# Patient Record
Sex: FEMALE | Race: BLACK OR AFRICAN AMERICAN | NOT HISPANIC OR LATINO | Employment: UNEMPLOYED | URBAN - METROPOLITAN AREA
[De-identification: names, ages, dates, MRNs, and addresses within clinical notes are randomized per-mention and may not be internally consistent; named-entity substitution may affect disease eponyms.]

---

## 2017-02-25 RX ORDER — FUROSEMIDE 40 MG/1
TABLET ORAL
Qty: 30 TABLET | Refills: 0 | Status: SHIPPED | OUTPATIENT
Start: 2017-02-25 | End: 2017-04-26 | Stop reason: SDUPTHER

## 2017-02-25 RX ORDER — LISINOPRIL 20 MG/1
TABLET ORAL
Qty: 30 TABLET | Refills: 0 | Status: SHIPPED | OUTPATIENT
Start: 2017-02-25 | End: 2017-04-26 | Stop reason: SDUPTHER

## 2017-03-09 DIAGNOSIS — I50.42 CHRONIC COMBINED SYSTOLIC AND DIASTOLIC CONGESTIVE HEART FAILURE: ICD-10-CM

## 2017-03-09 RX ORDER — CARVEDILOL 12.5 MG/1
TABLET ORAL
Qty: 60 TABLET | Refills: 0 | Status: SHIPPED | OUTPATIENT
Start: 2017-03-09 | End: 2017-04-26 | Stop reason: SDUPTHER

## 2017-04-26 DIAGNOSIS — I50.42 CHRONIC COMBINED SYSTOLIC AND DIASTOLIC CONGESTIVE HEART FAILURE: ICD-10-CM

## 2017-04-26 RX ORDER — LISINOPRIL 20 MG/1
TABLET ORAL
Qty: 30 TABLET | Refills: 0 | Status: SHIPPED | OUTPATIENT
Start: 2017-04-26 | End: 2017-05-08 | Stop reason: SDUPTHER

## 2017-04-26 RX ORDER — FUROSEMIDE 40 MG/1
TABLET ORAL
Qty: 30 TABLET | Refills: 0 | Status: SHIPPED | OUTPATIENT
Start: 2017-04-26 | End: 2017-06-06 | Stop reason: SDUPTHER

## 2017-04-26 RX ORDER — CARVEDILOL 12.5 MG/1
TABLET ORAL
Qty: 60 TABLET | Refills: 0 | Status: SHIPPED | OUTPATIENT
Start: 2017-04-26 | End: 2017-06-06 | Stop reason: SDUPTHER

## 2017-05-02 DIAGNOSIS — I50.22 CHRONIC SYSTOLIC HEART FAILURE: Primary | ICD-10-CM

## 2017-05-08 ENCOUNTER — OFFICE VISIT (OUTPATIENT)
Dept: TRANSPLANT | Facility: CLINIC | Age: 52
End: 2017-05-08
Payer: COMMERCIAL

## 2017-05-08 ENCOUNTER — HOSPITAL ENCOUNTER (OUTPATIENT)
Dept: CARDIOLOGY | Facility: CLINIC | Age: 52
Discharge: HOME | End: 2017-05-08
Payer: COMMERCIAL

## 2017-05-08 VITALS
WEIGHT: 136.88 LBS | HEART RATE: 72 BPM | SYSTOLIC BLOOD PRESSURE: 157 MMHG | HEIGHT: 64 IN | BODY MASS INDEX: 23.37 KG/M2 | DIASTOLIC BLOOD PRESSURE: 69 MMHG

## 2017-05-08 DIAGNOSIS — I10 ESSENTIAL HYPERTENSION: ICD-10-CM

## 2017-05-08 DIAGNOSIS — I50.32 CHRONIC DIASTOLIC CONGESTIVE HEART FAILURE: Primary | ICD-10-CM

## 2017-05-08 DIAGNOSIS — I50.22 CHRONIC SYSTOLIC HEART FAILURE: ICD-10-CM

## 2017-05-08 DIAGNOSIS — I42.8 CARDIOMYOPATHY, NONISCHEMIC: ICD-10-CM

## 2017-05-08 LAB
AORTIC VALVE REGURGITATION: ABNORMAL
ESTIMATED PA SYSTOLIC PRESSURE: 34.36
MITRAL VALVE REGURGITATION: ABNORMAL
RETIRED EF AND QEF - SEE NOTES: 55 (ref 55–65)
TRICUSPID VALVE REGURGITATION: ABNORMAL

## 2017-05-08 PROCEDURE — 3077F SYST BP >= 140 MM HG: CPT | Mod: S$GLB,,, | Performed by: INTERNAL MEDICINE

## 2017-05-08 PROCEDURE — 3078F DIAST BP <80 MM HG: CPT | Mod: S$GLB,,, | Performed by: INTERNAL MEDICINE

## 2017-05-08 PROCEDURE — 99214 OFFICE O/P EST MOD 30 MIN: CPT | Mod: S$GLB,,, | Performed by: INTERNAL MEDICINE

## 2017-05-08 PROCEDURE — 99999 PR PBB SHADOW E&M-EST. PATIENT-LVL III: CPT | Mod: PBBFAC,,, | Performed by: INTERNAL MEDICINE

## 2017-05-08 PROCEDURE — 93306 TTE W/DOPPLER COMPLETE: CPT | Mod: S$GLB,,, | Performed by: INTERNAL MEDICINE

## 2017-05-08 PROCEDURE — 1160F RVW MEDS BY RX/DR IN RCRD: CPT | Mod: S$GLB,,, | Performed by: INTERNAL MEDICINE

## 2017-05-08 RX ORDER — LISINOPRIL 10 MG/1
10 TABLET ORAL DAILY
Qty: 90 TABLET | Refills: 3 | Status: SHIPPED | OUTPATIENT
Start: 2017-05-08 | End: 2019-01-30 | Stop reason: SDUPTHER

## 2017-05-08 RX ORDER — IBUPROFEN 800 MG/1
TABLET ORAL
COMMUNITY
Start: 2017-05-01 | End: 2022-01-14

## 2017-05-08 NOTE — PROGRESS NOTES
"Subjective:     HPI:  Ms. Diaz is very pleasant 48 y/o female with PMH significant for NICMP LVEF 25% with reverse remodeling to 60% on Echo done today. She is doing well and continues to report NYHA class II symptoms.  She denies SOB at rest, CP, lightheadedness, or other c/o's. No admissions for ADHF in the last year. Her HF regimen incudes Coreg 12.5 mg BID, Lasix 40 mg daily and was on Lisinopril 20 mg daily but states that it was discontinued last week because of low BP. Her BP today is elevated.   Creatinine is 0.9.    Past Medical History:   Diagnosis Date    Cardiomyopathy     CHF (congestive heart failure)     Hypertension      No past surgical history on file.  OB History     No data available        Review of Systems   Constitution: Negative. Negative for chills, decreased appetite, diaphoresis, fever, weakness, malaise/fatigue, night sweats, weight gain and weight loss.   Eyes: Negative.    Cardiovascular: Negative for chest pain, claudication, cyanosis, dyspnea on exertion, irregular heartbeat, leg swelling, near-syncope, orthopnea, palpitations, paroxysmal nocturnal dyspnea and syncope.   Respiratory: Negative for cough, hemoptysis and shortness of breath.    Endocrine: Negative.    Hematologic/Lymphatic: Negative.    Skin: Negative for color change, dry skin and nail changes.   Musculoskeletal: Negative.    Gastrointestinal: Negative.    Genitourinary: Negative.        Objective:   Blood pressure (!) 157/69, pulse 72, height 5' 4" (1.626 m), weight 62.1 kg (136 lb 14.5 oz).body mass index is 23.5 kg/(m^2).  Physical Exam   Constitutional: She is oriented to person, place, and time. Vital signs are normal. She appears well-developed and well-nourished.   HENT:   Head: Normocephalic.   Eyes: Pupils are equal, round, and reactive to light.   Neck: Neck supple. No JVD present.   Cardiovascular: Normal rate, regular rhythm and normal heart sounds.  PMI is not displaced.  Exam reveals no gallop and no " friction rub.    No murmur heard.  Pulmonary/Chest: Effort normal and breath sounds normal. No respiratory distress. She has no wheezes. She has no rales.   Abdominal: Soft. Bowel sounds are normal. She exhibits no distension. There is no tenderness. There is no rebound.   Musculoskeletal: She exhibits no edema.   Neurological: She is alert and oriented to person, place, and time.   Nursing note and vitals reviewed.      Labs:    Chemistry        Component Value Date/Time     05/08/2017 1109    K 4.7 05/08/2017 1109     (H) 05/08/2017 1109    CO2 25 05/08/2017 1109    BUN 8 05/08/2017 1109    CREATININE 0.9 05/08/2017 1109    GLU 87 05/08/2017 1109        Component Value Date/Time    CALCIUM 9.8 05/08/2017 1109    ALKPHOS 67 05/08/2017 1109    AST 14 05/08/2017 1109    ALT 9 (L) 05/08/2017 1109    BILITOT 0.3 05/08/2017 1109          Magnesium   Date Value Ref Range Status   05/08/2017 2.1 1.6 - 2.6 mg/dL Final     Lab Results   Component Value Date    WBC 8.68 05/08/2017    HGB 10.5 (L) 05/08/2017    HCT 30.3 (L) 05/08/2017     05/08/2017     Lab Results   Component Value Date    INR 1.1 12/25/2010    INR 1.1 12/24/2010     BNP   Date Value Ref Range Status   05/08/2017 224 (H) 0 - 99 pg/mL Final     Comment:     Values of less than 100 pg/ml are consistent with non-CHF populations.   09/10/2015 143 (H) 0 - 99 pg/mL Final     Comment:     Values of less than 100 pg/ml are consistent with non-CHF populations.   07/28/2014 34 0 - 99 pg/mL Final     Comment:     Values of less than 100 pg/ml are consistent with non-CHF populations.     No results found for: LDH  No results found for this or any previous visit.  No results found for this or any previous visit.    Assessment:      1. Chronic diastolic congestive heart failure    2. Cardiomyopathy, nonischemic    3. Essential hypertension        Plan:   Doing very well. Euvolemic on exam. NYHA class II symptoms. EF=55-60% by echo today  Continue GDMT.  In view of her elevated BP, will resume Lisinopril 10 mg daily. BMP in 1 week.    Recommend 2 gram sodium restriction and 1500cc fluid restriction.  Encourage physical activity with graded exercise program.  Requested patient to weigh themselves daily, and to notify us if their weight increases by more than 3 lbs in 1 day or 5 lbs in 1 week.   RTC in 1 year with labs     Aldo jordan MD

## 2017-05-08 NOTE — MR AVS SNAPSHOT
[facility]  [address]  [place] [address]  Phone: [phone]                  Nafisa Diaz   2017 2:00 PM   Office Visit    Description:  Female : 1965   Provider:  Aldo Marcus MD   Department:  [facility]           Reason for Visit     Congestive Heart Failure           Diagnoses this Visit        Comments    Chronic diastolic congestive heart failure    -  Primary     Cardiomyopathy, nonischemic         Essential hypertension                To Do List           Goals (5 Years of Data)     None      Follow-Up and Disposition     Return in about 1 year (around 2018).       These Medications        Disp Refills Start End    lisinopril 10 MG tablet 90 tablet 3 2017     Take 1 tablet (10 mg total) by mouth once daily. - Oral    Pharmacy: [facility] - [place], [place] - [address] Ph #: [phone]         [facility]     [facility] Nurse Care Line -  Assistance  Unless otherwise directed by your provider, please contact [facility], our nurse care line that is available for  assistance.     Registered nurses in the [facility] provide: appointment scheduling, clinical advisement, health education, and other advisory services.  Call: [phone] (toll free)               Medications           Message regarding Medications     Verify the changes and/or additions to your medication regime listed below are the same as discussed with your clinician today.  If any of these changes or additions are incorrect, please notify your healthcare provider.        CHANGE how you are taking these medications     Start Taking Instead of    lisinopril 10 MG tablet lisinopril (PRINIVIL,ZESTRIL) 20 MG tablet    Dosage:  Take 1 tablet (10 mg total) by mouth once daily. Dosage:  TAKE 1 TABLET ONCE DAILY FOR BLOOD PRESSURE    Reason for Change:  Reorder            Verify that the below list of medications  "is an accurate representation of the medications you are currently taking.  If none reported, the list may be blank. If incorrect, please contact your healthcare provider. Carry this list with you in case of emergency.           Current Medications     aspirin (ECOTRIN) 81 MG EC tablet Take 1 tablet by mouth.    carvedilol (COREG) 12.5 MG tablet TAKE ONE TABLET BY MOUTH 2 TIMES A DAY WITH MEALS    ferrous sulfate 325 (65 FE) MG EC tablet Take 1 tablet by mouth.    furosemide (LASIX) 40 MG tablet TAKE 1 TABLET BY MOUTH ONCE DAILY.    ibuprofen (ADVIL,MOTRIN) 800 MG tablet     lisinopril 10 MG tablet Take 1 tablet (10 mg total) by mouth once daily.    zolpidem (AMBIEN) 5 MG Tab Take 1 tablet by mouth.           Clinical Reference Information           Your Vitals Were     BP Pulse Height Weight BMI    157/69 (BP Location: Left arm, Patient Position: Sitting, BP Method: Automatic) 72 5' 4" (1.626 m) 62.1 kg (136 lb 14.5 oz) 23.5 kg/m2      Blood Pressure          Most Recent Value    BP  (!)  157/69      Allergies as of 5/8/2017     No Known Drug Allergies      Immunizations Administered on Date of Encounter - 5/8/2017     None      Orders Placed During Today's Visit     Future Labs/Procedures Expected by Expires    Basic metabolic panel  5/15/2017 (Approximate) 7/7/2018    Comprehensive metabolic panel  As directed 5/8/2018    Recurring Lab Work Interval Expires    Brain natriuretic peptide  Every 4 Weeks until 5/8/2018 5/8/2018      [facility] Sign-Up     Activating your [facility] account is as easy as 1-2-3!     1) Visit [email or web address], select Sign Up Now, enter this activation code and your date of birth, then select Next.  [email or web address]  Expires: 6/22/2017  2:48 PM      2) Create a username and password to use when you visit [facility] in the future and select a security question in case you lose your password and select Next.    3) Enter your e-mail address and click Sign Up!    Additional Information  If you " have questions, please e-mail [email or web address] or call [phone] to talk to our [facility] staff. Remember, [facility] is NOT to be used for urgent needs. For medical emergencies, dial 911.         Smoking Cessation     If you would like to quit smoking:   You may be eligible for free services if you are a [place] resident and started smoking cigarettes before September 1, 1988.  Call the Smoking Cessation Trust ([facility]) toll free at (904) [phone] or [phone].   Call 1-800-QUIT-NOW if you do not meet the above criteria.   Contact us via email: [email or web address]   View our website for more information: [email or web address]        Language Assistance Services     ATTENTION: Language assistance services are available, free of charge. Please call [phone].      ATENCIÓN: Si habla español, tiene a nobles disposición servicios gratuitos de asistencia lingüística. Llame al [phone].     CHÚ Ý: N?u b?n nói Ti?ng Vi?t, có các d?ch v? h? tr? ngôn ng? mi?n phí dành cho b?n. G?i s? [phone].         [facility] complies with applicable Federal civil rights laws and does not discriminate on the basis of race, color, national origin, age, disability, or sex.

## 2017-06-06 DIAGNOSIS — I50.42 CHRONIC COMBINED SYSTOLIC AND DIASTOLIC CONGESTIVE HEART FAILURE: ICD-10-CM

## 2017-06-06 RX ORDER — FUROSEMIDE 40 MG/1
TABLET ORAL
Qty: 30 TABLET | Refills: 0 | Status: SHIPPED | OUTPATIENT
Start: 2017-06-06 | End: 2017-07-18 | Stop reason: SDUPTHER

## 2017-06-07 RX ORDER — CARVEDILOL 12.5 MG/1
TABLET ORAL
Qty: 60 TABLET | Refills: 0 | Status: SHIPPED | OUTPATIENT
Start: 2017-06-07 | End: 2017-07-18 | Stop reason: SDUPTHER

## 2017-07-18 DIAGNOSIS — I50.42 CHRONIC COMBINED SYSTOLIC AND DIASTOLIC CONGESTIVE HEART FAILURE: ICD-10-CM

## 2017-07-18 RX ORDER — FUROSEMIDE 40 MG/1
TABLET ORAL
Qty: 30 TABLET | Refills: 0 | Status: SHIPPED | OUTPATIENT
Start: 2017-07-18 | End: 2017-09-11 | Stop reason: SDUPTHER

## 2017-07-20 RX ORDER — CARVEDILOL 12.5 MG/1
TABLET ORAL
Qty: 60 TABLET | Refills: 0 | Status: SHIPPED | OUTPATIENT
Start: 2017-07-20 | End: 2017-07-21 | Stop reason: SDUPTHER

## 2017-07-21 DIAGNOSIS — I50.42 CHRONIC COMBINED SYSTOLIC AND DIASTOLIC CONGESTIVE HEART FAILURE: ICD-10-CM

## 2017-07-24 RX ORDER — CARVEDILOL 12.5 MG/1
TABLET ORAL
Qty: 60 TABLET | Refills: 0 | Status: SHIPPED | OUTPATIENT
Start: 2017-07-24 | End: 2017-12-06 | Stop reason: SDUPTHER

## 2017-09-11 RX ORDER — FUROSEMIDE 40 MG/1
TABLET ORAL
Qty: 30 TABLET | Refills: 0 | Status: SHIPPED | OUTPATIENT
Start: 2017-09-11 | End: 2017-10-06 | Stop reason: SDUPTHER

## 2017-10-06 RX ORDER — FUROSEMIDE 40 MG/1
TABLET ORAL
Qty: 30 TABLET | Refills: 0 | Status: SHIPPED | OUTPATIENT
Start: 2017-10-06 | End: 2017-12-06 | Stop reason: SDUPTHER

## 2017-12-06 DIAGNOSIS — I50.42 CHRONIC COMBINED SYSTOLIC AND DIASTOLIC CONGESTIVE HEART FAILURE: ICD-10-CM

## 2017-12-06 RX ORDER — CARVEDILOL 12.5 MG/1
TABLET ORAL
Qty: 60 TABLET | Refills: 0 | Status: SHIPPED | OUTPATIENT
Start: 2017-12-06 | End: 2018-01-15 | Stop reason: SDUPTHER

## 2017-12-06 RX ORDER — FUROSEMIDE 40 MG/1
TABLET ORAL
Qty: 30 TABLET | Refills: 0 | Status: SHIPPED | OUTPATIENT
Start: 2017-12-06 | End: 2018-01-15 | Stop reason: SDUPTHER

## 2018-01-03 ENCOUNTER — HOSPITAL ENCOUNTER (EMERGENCY)
Facility: HOSPITAL | Age: 53
Discharge: HOME | End: 2018-01-03
Attending: EMERGENCY MEDICINE
Payer: COMMERCIAL

## 2018-01-03 VITALS
HEIGHT: 65 IN | DIASTOLIC BLOOD PRESSURE: 62 MMHG | HEART RATE: 66 BPM | OXYGEN SATURATION: 99 % | SYSTOLIC BLOOD PRESSURE: 116 MMHG | WEIGHT: 125 LBS | BODY MASS INDEX: 20.83 KG/M2 | RESPIRATION RATE: 18 BRPM | TEMPERATURE: 99 F

## 2018-01-03 DIAGNOSIS — K62.5 RECTAL BLEEDING: Primary | ICD-10-CM

## 2018-01-03 LAB
ALBUMIN SERPL BCP-MCNC: 3.7 G/DL
ALP SERPL-CCNC: 73 U/L
ALT SERPL W/O P-5'-P-CCNC: 14 U/L
ANION GAP SERPL CALC-SCNC: 8 MMOL/L
AST SERPL-CCNC: 16 U/L
BASOPHILS # BLD AUTO: 0.07 K/UL
BASOPHILS NFR BLD: 0.7 %
BILIRUB SERPL-MCNC: 0.3 MG/DL
BUN SERPL-MCNC: 10 MG/DL
CALCIUM SERPL-MCNC: 9.4 MG/DL
CHLORIDE SERPL-SCNC: 110 MMOL/L
CO2 SERPL-SCNC: 24 MMOL/L
CREAT SERPL-MCNC: 0.9 MG/DL
DIFFERENTIAL METHOD: ABNORMAL
EOSINOPHIL # BLD AUTO: 0.3 K/UL
EOSINOPHIL NFR BLD: 3.5 %
ERYTHROCYTE [DISTWIDTH] IN BLOOD BY AUTOMATED COUNT: 14 %
EST. GFR  (AFRICAN AMERICAN): >60 ML/MIN/1.73 M^2
EST. GFR  (NON AFRICAN AMERICAN): >60 ML/MIN/1.73 M^2
GLUCOSE SERPL-MCNC: 90 MG/DL
HCT VFR BLD AUTO: 32.3 %
HGB BLD-MCNC: 11 G/DL
IMM GRANULOCYTES # BLD AUTO: 0.04 K/UL
IMM GRANULOCYTES NFR BLD AUTO: 0.4 %
LYMPHOCYTES # BLD AUTO: 1.8 K/UL
LYMPHOCYTES NFR BLD: 18.7 %
MCH RBC QN AUTO: 26.9 PG
MCHC RBC AUTO-ENTMCNC: 34.1 G/DL
MCV RBC AUTO: 79 FL
MONOCYTES # BLD AUTO: 0.7 K/UL
MONOCYTES NFR BLD: 7.1 %
NEUTROPHILS # BLD AUTO: 6.7 K/UL
NEUTROPHILS NFR BLD: 69.6 %
NRBC BLD-RTO: 0 /100 WBC
PLATELET # BLD AUTO: 271 K/UL
PMV BLD AUTO: 10.5 FL
POTASSIUM SERPL-SCNC: 3.7 MMOL/L
PROT SERPL-MCNC: 7.2 G/DL
RBC # BLD AUTO: 4.09 M/UL
SODIUM SERPL-SCNC: 142 MMOL/L
WBC # BLD AUTO: 9.63 K/UL

## 2018-01-03 PROCEDURE — 80053 COMPREHEN METABOLIC PANEL: CPT

## 2018-01-03 PROCEDURE — 85025 COMPLETE CBC W/AUTO DIFF WBC: CPT

## 2018-01-03 PROCEDURE — 99283 EMERGENCY DEPT VISIT LOW MDM: CPT

## 2018-01-03 NOTE — ED TRIAGE NOTES
Presents to ER with bright red rectal bleeding that has occurred off and on 2 weeks.  Denies nausea, vomiting or abdominal cramping.    GENERAL: The patient is well-developed and well-nourished in no apparent distress. Alert and oriented x4.                                                HEENT: Head is normocephalic and atraumatic. Extraocular muscles are intact. Pupils are equal, round, and reactive to light and accommodation. Nares appeared normal. Mouth is well hydrated and without lesions. Mucous membranes are moist. Posterior pharynx clear of any exudate or lesions.     LUNGS: Clear to auscultation.    HEART: Regular rate and rhythm.    ABDOMEN: Soft, nontender, and nondistended. Positive bowel sounds. No hepatosplenomegaly was noted.     EXTREMITIES: Without any cyanosis, clubbing, rash, lesions or edema.     NEUROLOGIC: GCS 15     PSYCHIATRIC: Flat affect, but denies suicidal or homicidal ideations.    SKIN: No ulceration or induration present.

## 2018-01-03 NOTE — ED PROVIDER NOTES
Encounter Date: 1/3/2018    SCRIBE #1 NOTE: I, Mark Peter, am scribing for, and in the presence of,  Shruti Moser. I have scribed the following portions of the note - Other sections scribed: HPI, PE, and APC MDM.       History     Chief Complaint   Patient presents with    Rectal Bleeding     stool brown, with blood, not on blood thinners     Time patient was seen by the provider: 11:40 AM    The patient is a 53 y.o. female with hx of: Hemorrhoids, CHF, HTN who presents to the ED with a complaint of bright red blood per rectum. She reports an episode of bleeding last month and intermittent bleeding for the past week.  She also reports intermittent lower abdominal cramping over the past couple of months. Of note, pt has a history of hemorrhoids requiring surgical removal. Pt reports her last colonoscopy was over 10 years ago. Denies melena, weakness, nausea, vomiting, diarrhea, SOB, chest pain.       The history is provided by the patient.     Review of patient's allergies indicates:   Allergen Reactions    No known drug allergies      Past Medical History:   Diagnosis Date    Cardiomyopathy     CHF (congestive heart failure)     Hypertension      History reviewed. No pertinent surgical history.  History reviewed. No pertinent family history.  Social History   Substance Use Topics    Smoking status: Current Some Day Smoker    Smokeless tobacco: Never Used    Alcohol use Not on file     Review of Systems   Constitutional: Negative for fever.   HENT: Negative for sore throat.    Respiratory: Negative for shortness of breath.    Cardiovascular: Negative for chest pain.   Gastrointestinal: Positive for abdominal pain and anal bleeding. Negative for nausea.   Genitourinary: Negative for dysuria.   Musculoskeletal: Negative for back pain.   Skin: Negative for rash.   Neurological: Negative for weakness.   Hematological: Does not bruise/bleed easily.       Physical Exam     Initial Vitals [01/03/18 1109]   BP Pulse  Resp Temp SpO2   (!) 167/80 79 18 98.4 °F (36.9 °C) 97 %      MAP       109         Physical Exam    Nursing note and vitals reviewed.  Constitutional: She appears well-developed and well-nourished. She is not diaphoretic.  Non-toxic appearance. She does not appear ill. No distress.   HENT:   Head: Normocephalic and atraumatic.   Neck: Neck supple.   Cardiovascular: Normal rate and regular rhythm. Exam reveals no gallop and no friction rub.    No murmur heard.  Pulmonary/Chest: Effort normal and breath sounds normal. No accessory muscle usage. No tachypnea. No respiratory distress. She has no decreased breath sounds. She has no wheezes. She has no rhonchi. She has no rales.   Abdominal: Soft. Normal appearance and bowel sounds are normal. She exhibits no distension. There is no tenderness.   No CVA tenderness    Genitourinary:   Genitourinary Comments: Small internal hemorrhoids. No soraya blood. Hemoccult negative.    Musculoskeletal: Normal range of motion.   Neurological: She is alert and oriented to person, place, and time.   Skin: Skin is warm and dry. No rash noted. No pallor.   Psychiatric: She has a normal mood and affect. Her behavior is normal. Judgment and thought content normal.         ED Course   Procedures  Labs Reviewed   CBC W/ AUTO DIFFERENTIAL - Abnormal; Notable for the following:        Result Value    Hemoglobin 11.0 (*)     Hematocrit 32.3 (*)     MCV 79 (*)     MCH 26.9 (*)     All other components within normal limits   COMPREHENSIVE METABOLIC PANEL             Medical Decision Making:   History:   Old Medical Records: I decided to obtain old medical records.  Differential Diagnosis:   My differential diagnosis includes but is not limited to: bleeding hemorrhoids, diverticular bleed, anal fissure, and anemia.    Clinical Tests:   Lab Tests: Ordered and Reviewed       APC / Resident Notes:   53-year-old female presents with bright red blood per rectum intermittently over the past week.  She is  hypertensive to 167/80.  Vitals otherwise within normal limits.  Physical exam is remarkable for small internal hemorrhoid.  No soraya blood.  Hemoccult is negative.  Abdomen was soft and nontender.    CBC and CMP are unremarkable for acute findings.  I will discharge patient in stable condition with contact information for follow-up with [facility] for further evaluation with colonoscopy.  Return precautions given. I have reviewed the patient's records and discussed this case with my supervising physician.         Scribe Attestation:   Scribe #1: I performed the above scribed service and the documentation accurately describes the services I performed. I attest to the accuracy of the note.            ED Course      Clinical Impression:   The encounter diagnosis was Rectal bleeding.    Disposition:   Disposition: Discharged  Condition: Stable       I, Shruti Moser, personally performed the services described in this documentation. All medical record entries made by the scribe were at my direction and in my presence.  I have reviewed the chart and agree that the record reflects my personal performance and is accurate and complete. Shruti Moser, CHANDA.  1:28 PM 01/04/2018                   Shruti Moser PA-C  01/04/18 1329

## 2018-01-15 DIAGNOSIS — I50.42 CHRONIC COMBINED SYSTOLIC AND DIASTOLIC CONGESTIVE HEART FAILURE: ICD-10-CM

## 2018-01-15 RX ORDER — FUROSEMIDE 40 MG/1
TABLET ORAL
Qty: 30 TABLET | Refills: 0 | Status: SHIPPED | OUTPATIENT
Start: 2018-01-15 | End: 2018-03-21 | Stop reason: SDUPTHER

## 2018-01-17 RX ORDER — CARVEDILOL 12.5 MG/1
TABLET ORAL
Qty: 60 TABLET | Refills: 0 | Status: SHIPPED | OUTPATIENT
Start: 2018-01-17 | End: 2018-02-26 | Stop reason: SDUPTHER

## 2018-02-26 DIAGNOSIS — I50.42 CHRONIC COMBINED SYSTOLIC AND DIASTOLIC CONGESTIVE HEART FAILURE: ICD-10-CM

## 2018-02-26 RX ORDER — CARVEDILOL 12.5 MG/1
TABLET ORAL
Qty: 60 TABLET | Refills: 0 | Status: SHIPPED | OUTPATIENT
Start: 2018-02-26 | End: 2018-04-24 | Stop reason: SDUPTHER

## 2018-03-21 RX ORDER — FUROSEMIDE 40 MG/1
TABLET ORAL
Qty: 30 TABLET | Refills: 0 | Status: SHIPPED | OUTPATIENT
Start: 2018-03-21 | End: 2018-06-06 | Stop reason: SDUPTHER

## 2018-04-24 DIAGNOSIS — I50.42 CHRONIC COMBINED SYSTOLIC AND DIASTOLIC CONGESTIVE HEART FAILURE: ICD-10-CM

## 2018-04-24 RX ORDER — CARVEDILOL 12.5 MG/1
TABLET ORAL
Qty: 60 TABLET | Refills: 0 | Status: SHIPPED | OUTPATIENT
Start: 2018-04-24 | End: 2018-06-06 | Stop reason: SDUPTHER

## 2018-06-06 DIAGNOSIS — I50.42 CHRONIC COMBINED SYSTOLIC AND DIASTOLIC CONGESTIVE HEART FAILURE: ICD-10-CM

## 2018-06-06 RX ORDER — CARVEDILOL 12.5 MG/1
TABLET ORAL
Qty: 60 TABLET | Refills: 0 | Status: SHIPPED | OUTPATIENT
Start: 2018-06-06 | End: 2018-07-20 | Stop reason: SDUPTHER

## 2018-06-06 RX ORDER — FUROSEMIDE 40 MG/1
TABLET ORAL
Qty: 30 TABLET | Refills: 0 | Status: SHIPPED | OUTPATIENT
Start: 2018-06-06 | End: 2018-07-20 | Stop reason: SDUPTHER

## 2018-07-20 DIAGNOSIS — I50.42 CHRONIC COMBINED SYSTOLIC AND DIASTOLIC CONGESTIVE HEART FAILURE: ICD-10-CM

## 2018-07-20 RX ORDER — FUROSEMIDE 40 MG/1
TABLET ORAL
Qty: 30 TABLET | Refills: 0 | Status: SHIPPED | OUTPATIENT
Start: 2018-07-20 | End: 2018-09-17 | Stop reason: SDUPTHER

## 2018-07-21 RX ORDER — CARVEDILOL 12.5 MG/1
TABLET ORAL
Qty: 60 TABLET | Refills: 0 | Status: SHIPPED | OUTPATIENT
Start: 2018-07-21 | End: 2018-10-25 | Stop reason: SDUPTHER

## 2018-09-17 RX ORDER — FUROSEMIDE 40 MG/1
TABLET ORAL
Qty: 30 TABLET | Refills: 0 | Status: SHIPPED | OUTPATIENT
Start: 2018-09-17 | End: 2018-10-25 | Stop reason: SDUPTHER

## 2018-10-25 DIAGNOSIS — I50.42 CHRONIC COMBINED SYSTOLIC AND DIASTOLIC CONGESTIVE HEART FAILURE: ICD-10-CM

## 2018-10-25 RX ORDER — CARVEDILOL 12.5 MG/1
TABLET ORAL
Qty: 60 TABLET | Refills: 0 | Status: SHIPPED | OUTPATIENT
Start: 2018-10-25 | End: 2018-12-28 | Stop reason: SDUPTHER

## 2018-10-26 RX ORDER — FUROSEMIDE 40 MG/1
TABLET ORAL
Qty: 30 TABLET | Refills: 0 | Status: SHIPPED | OUTPATIENT
Start: 2018-10-26 | End: 2018-12-27 | Stop reason: SDUPTHER

## 2018-12-27 RX ORDER — FUROSEMIDE 40 MG/1
TABLET ORAL
Qty: 30 TABLET | Refills: 0 | Status: SHIPPED | OUTPATIENT
Start: 2018-12-27 | End: 2019-02-05 | Stop reason: SDUPTHER

## 2018-12-28 DIAGNOSIS — I50.42 CHRONIC COMBINED SYSTOLIC AND DIASTOLIC CONGESTIVE HEART FAILURE: ICD-10-CM

## 2018-12-31 RX ORDER — CARVEDILOL 12.5 MG/1
TABLET ORAL
Qty: 60 TABLET | Refills: 0 | Status: SHIPPED | OUTPATIENT
Start: 2018-12-31 | End: 2019-03-14 | Stop reason: SDUPTHER

## 2019-01-30 DIAGNOSIS — I50.32 CHRONIC DIASTOLIC CONGESTIVE HEART FAILURE: ICD-10-CM

## 2019-01-30 RX ORDER — LISINOPRIL 10 MG/1
TABLET ORAL
Qty: 90 TABLET | Refills: 0 | Status: SHIPPED | OUTPATIENT
Start: 2019-01-30 | End: 2019-05-06 | Stop reason: SDUPTHER

## 2019-02-05 RX ORDER — FUROSEMIDE 40 MG/1
TABLET ORAL
Qty: 30 TABLET | Refills: 0 | Status: SHIPPED | OUTPATIENT
Start: 2019-02-05 | End: 2019-03-06 | Stop reason: SDUPTHER

## 2019-03-06 RX ORDER — FUROSEMIDE 40 MG/1
TABLET ORAL
Qty: 30 TABLET | Refills: 0 | Status: SHIPPED | OUTPATIENT
Start: 2019-03-06 | End: 2019-04-30 | Stop reason: SDUPTHER

## 2019-03-14 DIAGNOSIS — I50.42 CHRONIC COMBINED SYSTOLIC AND DIASTOLIC CONGESTIVE HEART FAILURE: ICD-10-CM

## 2019-03-14 RX ORDER — CARVEDILOL 12.5 MG/1
TABLET ORAL
Qty: 60 TABLET | Refills: 0 | Status: SHIPPED | OUTPATIENT
Start: 2019-03-14 | End: 2019-04-30 | Stop reason: SDUPTHER

## 2019-04-30 DIAGNOSIS — I50.42 CHRONIC COMBINED SYSTOLIC AND DIASTOLIC CONGESTIVE HEART FAILURE: ICD-10-CM

## 2019-04-30 RX ORDER — CARVEDILOL 12.5 MG/1
TABLET ORAL
Qty: 60 TABLET | Refills: 0 | Status: SHIPPED | OUTPATIENT
Start: 2019-04-30 | End: 2019-07-29 | Stop reason: SDUPTHER

## 2019-04-30 RX ORDER — FUROSEMIDE 40 MG/1
TABLET ORAL
Qty: 30 TABLET | Refills: 0 | Status: SHIPPED | OUTPATIENT
Start: 2019-04-30 | End: 2019-06-18 | Stop reason: SDUPTHER

## 2019-05-06 DIAGNOSIS — I50.32 CHRONIC DIASTOLIC CONGESTIVE HEART FAILURE: ICD-10-CM

## 2019-05-06 RX ORDER — LISINOPRIL 10 MG/1
TABLET ORAL
Qty: 90 TABLET | Refills: 0 | Status: SHIPPED | OUTPATIENT
Start: 2019-05-06 | End: 2019-07-29 | Stop reason: SDUPTHER

## 2019-06-18 RX ORDER — FUROSEMIDE 40 MG/1
TABLET ORAL
Qty: 30 TABLET | Refills: 0 | Status: SHIPPED | OUTPATIENT
Start: 2019-06-18 | End: 2019-07-29 | Stop reason: SDUPTHER

## 2019-06-24 ENCOUNTER — HOSPITAL ENCOUNTER (EMERGENCY)
Facility: HOSPITAL | Age: 54
Discharge: HOME | End: 2019-06-24
Attending: EMERGENCY MEDICINE
Payer: COMMERCIAL

## 2019-06-24 VITALS
HEART RATE: 73 BPM | TEMPERATURE: 98 F | SYSTOLIC BLOOD PRESSURE: 109 MMHG | BODY MASS INDEX: 22.2 KG/M2 | OXYGEN SATURATION: 98 % | DIASTOLIC BLOOD PRESSURE: 63 MMHG | HEIGHT: 64 IN | WEIGHT: 130 LBS | RESPIRATION RATE: 18 BRPM

## 2019-06-24 DIAGNOSIS — J39.2 PHARYNGEAL SWELLING: ICD-10-CM

## 2019-06-24 DIAGNOSIS — J02.9 PHARYNGITIS, UNSPECIFIED ETIOLOGY: Primary | ICD-10-CM

## 2019-06-24 LAB
ANION GAP SERPL CALC-SCNC: 10 MMOL/L (ref 8–16)
B-HCG UR QL: NEGATIVE
BASOPHILS # BLD AUTO: 0.06 K/UL (ref 0–0.2)
BASOPHILS NFR BLD: 0.5 % (ref 0–1.9)
BUN SERPL-MCNC: 23 MG/DL (ref 6–20)
CALCIUM SERPL-MCNC: 10.6 MG/DL (ref 8.7–10.5)
CHLORIDE SERPL-SCNC: 107 MMOL/L (ref 95–110)
CO2 SERPL-SCNC: 24 MMOL/L (ref 23–29)
CREAT SERPL-MCNC: 1.3 MG/DL (ref 0.5–1.4)
CTP QC/QA: YES
DIFFERENTIAL METHOD: ABNORMAL
EOSINOPHIL # BLD AUTO: 0.5 K/UL (ref 0–0.5)
EOSINOPHIL NFR BLD: 3.5 % (ref 0–8)
ERYTHROCYTE [DISTWIDTH] IN BLOOD BY AUTOMATED COUNT: 14 % (ref 11.5–14.5)
EST. GFR  (AFRICAN AMERICAN): 54.5 ML/MIN/1.73 M^2
EST. GFR  (NON AFRICAN AMERICAN): 47.3 ML/MIN/1.73 M^2
GLUCOSE SERPL-MCNC: 89 MG/DL (ref 70–110)
HCT VFR BLD AUTO: 36.1 % (ref 37–48.5)
HGB BLD-MCNC: 12.1 G/DL (ref 12–16)
IMM GRANULOCYTES # BLD AUTO: 0.05 K/UL (ref 0–0.04)
IMM GRANULOCYTES NFR BLD AUTO: 0.4 % (ref 0–0.5)
INR PPP: 0.9 (ref 0.8–1.2)
LYMPHOCYTES # BLD AUTO: 2.1 K/UL (ref 1–4.8)
LYMPHOCYTES NFR BLD: 16 % (ref 18–48)
MCH RBC QN AUTO: 26.6 PG (ref 27–31)
MCHC RBC AUTO-ENTMCNC: 33.5 G/DL (ref 32–36)
MCV RBC AUTO: 79 FL (ref 82–98)
MONOCYTES # BLD AUTO: 0.9 K/UL (ref 0.3–1)
MONOCYTES NFR BLD: 6.6 % (ref 4–15)
NEUTROPHILS # BLD AUTO: 9.5 K/UL (ref 1.8–7.7)
NEUTROPHILS NFR BLD: 73 % (ref 38–73)
NRBC BLD-RTO: 0 /100 WBC
PLATELET # BLD AUTO: 347 K/UL (ref 150–350)
PMV BLD AUTO: 10.1 FL (ref 9.2–12.9)
POTASSIUM SERPL-SCNC: 4.6 MMOL/L (ref 3.5–5.1)
PROTHROMBIN TIME: 9.6 SEC (ref 9–12.5)
RBC # BLD AUTO: 4.55 M/UL (ref 4–5.4)
SODIUM SERPL-SCNC: 141 MMOL/L (ref 136–145)
WBC # BLD AUTO: 12.98 K/UL (ref 3.9–12.7)

## 2019-06-24 PROCEDURE — 85610 PROTHROMBIN TIME: CPT

## 2019-06-24 PROCEDURE — 99284 EMERGENCY DEPT VISIT MOD MDM: CPT | Mod: 25

## 2019-06-24 PROCEDURE — 99285 PR EMERGENCY DEPT VISIT,LEVEL V: ICD-10-PCS | Mod: ,,, | Performed by: EMERGENCY MEDICINE

## 2019-06-24 PROCEDURE — 25000003 PHARM REV CODE 250: Performed by: EMERGENCY MEDICINE

## 2019-06-24 PROCEDURE — 63600175 PHARM REV CODE 636 W HCPCS: Performed by: EMERGENCY MEDICINE

## 2019-06-24 PROCEDURE — 81025 URINE PREGNANCY TEST: CPT | Performed by: EMERGENCY MEDICINE

## 2019-06-24 PROCEDURE — 96374 THER/PROPH/DIAG INJ IV PUSH: CPT

## 2019-06-24 PROCEDURE — 80048 BASIC METABOLIC PNL TOTAL CA: CPT

## 2019-06-24 PROCEDURE — 85025 COMPLETE CBC W/AUTO DIFF WBC: CPT

## 2019-06-24 PROCEDURE — 25500020 PHARM REV CODE 255: Performed by: EMERGENCY MEDICINE

## 2019-06-24 PROCEDURE — 99285 EMERGENCY DEPT VISIT HI MDM: CPT | Mod: ,,, | Performed by: EMERGENCY MEDICINE

## 2019-06-24 RX ORDER — AMOXICILLIN AND CLAVULANATE POTASSIUM 875; 125 MG/1; MG/1
1 TABLET, FILM COATED ORAL 2 TIMES DAILY
Qty: 20 TABLET | Refills: 0 | Status: SHIPPED | OUTPATIENT
Start: 2019-06-24 | End: 2019-07-04

## 2019-06-24 RX ORDER — DEXAMETHASONE SODIUM PHOSPHATE 4 MG/ML
8 INJECTION, SOLUTION INTRA-ARTICULAR; INTRALESIONAL; INTRAMUSCULAR; INTRAVENOUS; SOFT TISSUE
Status: COMPLETED | OUTPATIENT
Start: 2019-06-24 | End: 2019-06-24

## 2019-06-24 RX ORDER — AMOXICILLIN AND CLAVULANATE POTASSIUM 875; 125 MG/1; MG/1
1 TABLET, FILM COATED ORAL
Status: COMPLETED | OUTPATIENT
Start: 2019-06-24 | End: 2019-06-24

## 2019-06-24 RX ADMIN — AMOXICILLIN AND CLAVULANATE POTASSIUM 1 TABLET: 875; 125 TABLET, FILM COATED ORAL at 09:06

## 2019-06-24 RX ADMIN — IOHEXOL 75 ML: 350 INJECTION, SOLUTION INTRAVENOUS at 08:06

## 2019-06-24 RX ADMIN — DEXAMETHASONE SODIUM PHOSPHATE 8 MG: 4 INJECTION, SOLUTION INTRAMUSCULAR; INTRAVENOUS at 05:06

## 2019-06-24 NOTE — ED NOTES
Patient identifiers for Nafisa Diaz 54 y.o. female checked and correct.  Chief Complaint   Patient presents with    Oral Swelling     sent from  with fluid     Past Medical History:   Diagnosis Date    Cardiomyopathy     CHF (congestive heart failure)     Hypertension      Allergies reported:   Review of patient's allergies indicates:   Allergen Reactions    No known drug allergies          LOC: Patient is awake, alert, and aware of environment with an appropriate affect. Patient is oriented x 3 and speaking appropriately.  APPEARANCE: Patient resting comfortably and in no acute distress. Patient is clean and well groomed, patient's clothing is properly fastened.  HEENT: Patient has fluid pocket under her chin that she noticed yesterday. Swelling noted. States it has become bigger. Reports difficulty swallowing and a little difficulty breathing. Patient is able to talk, but it is difficult.  SKIN: The skin is warm and dry. Patient has normal skin turgor and moist mucus membranes.  MUSKULOSKELETAL: Patient is moving all extremities well, no obvious deformities noted. Pulses intact.   RESPIRATORY: Respirations are spontaneous and non-labored with normal effort and rate.   CARDIAC: Patient has a normal rate and rhythm. No peripheral edema noted.   ABDOMEN: No distention noted. Soft and non-tender upon palpation.  NEUROLOGICAL: PERRL. Facial expression is symmetrical. Hand grasps are equal bilaterally. Normal sensation in all extremities when touched with finger.

## 2019-06-24 NOTE — MEDICAL/APP STUDENT
"  History     Chief Complaint   Patient presents with    Oral Swelling     sent from  with fluid     Ms Diaz is a 53yo F w/ a pmhx of CHF and HTN who presents today with a enlarging neck mass. She noticed some difficulty swallowing last night. This morning she woke up w/ a muffled voice and progressively difficult swallowing She presented to urgent care and they sent her here. She has never had anything like this before. Of note, she denies fever, chills, URTI symptoms, drooling, N/V, or difficulty breathing. Forced coughing does not relieve the pain.          Past Medical History:   Diagnosis Date    Cardiomyopathy     CHF (congestive heart failure)     Hypertension        History reviewed. No pertinent surgical history.    History reviewed. No pertinent family history.    Social History     Tobacco Use    Smoking status: Current Some Day Smoker    Smokeless tobacco: Never Used   Substance Use Topics    Alcohol use: Not Currently    Drug use: No       Review of Systems   Constitutional: Negative for fatigue and fever.   HENT: Positive for facial swelling, sore throat, trouble swallowing and voice change. Negative for congestion, drooling, hearing loss, postnasal drip and rhinorrhea.    Eyes: Negative for discharge.   Respiratory: Negative for apnea, cough, shortness of breath and stridor.    Cardiovascular: Negative for chest pain.   Gastrointestinal: Negative for abdominal pain, diarrhea, nausea and vomiting.   Genitourinary: Negative for difficulty urinating and dysuria.   Musculoskeletal: Negative for arthralgias.   Skin: Negative for color change and rash.   Neurological: Negative for dizziness, weakness, numbness and headaches.   Psychiatric/Behavioral: Negative for agitation and behavioral problems.       Physical Exam   /63   Pulse 73   Temp 98.4 °F (36.9 °C) (Oral)   Resp 18   Ht 5' 4" (1.626 m)   Wt 59 kg (130 lb)   SpO2 98%   Breastfeeding? No   BMI 22.31 kg/m²     Physical " Exam    Vitals reviewed.  Constitutional: She appears well-developed and well-nourished. No distress.   HENT:   Head: Normocephalic and atraumatic.   Right Ear: Hearing and tympanic membrane normal.   Left Ear: Hearing and tympanic membrane normal.   Nose: Nose normal. No mucosal edema or rhinorrhea.   Mouth/Throat: Uvula is midline and oropharynx is clear and moist. No oropharyngeal exudate.   Neck: There is a fluctuant submandibular mass. It is soft and mobile and approximately 3cm in diameter. There is no erythema or drainage.Tenderness lymph node 2cm medial to the L mandibular angle.     Oropharynx: fluid collection posterior to the uvula. No tonsillar exudate.   Cardiovascular: Normal rate, regular rhythm, normal heart sounds and normal pulses.   Pulmonary/Chest: Effort normal and breath sounds normal. No respiratory distress.   Lymphadenopathy:        Head (left side): Submandibular adenopathy present.   L submandibular node tenderness         ED Course

## 2019-06-24 NOTE — ED PROVIDER NOTES
Encounter Date: 6/24/2019    SCRIBE #1 NOTE: I, Espinoza Abbasi, am scribing for, and in the presence of,  Dr. Sparrow. I have scribed the following portions of the note - Other sections scribed: HPI, ROS, Physical Exam.       History     Chief Complaint   Patient presents with    Oral Swelling     sent from  with fluid     Time patient was seen by the provider: 5:26 PM      The patient is a 54 y.o. female with co-morbidities including: hypertension, CHF, and cardiomyopathy who presents to the ED with a complaint of oral swelling. Yesterday patient felt something stuck in throat, causing a feeling of blockage in her throat. Symptoms worsened this morning, causing here to go to urgent care. At urgent care she was told it is a fluid pocket and was sent to the emergency department. No medical allergies or surgery. Medications include lisinopril, furosemide, and carvedilol. Notes some shortness of breath, but denies fever, cough, dysuria, chest pain, or abdominal pain. A ten point review of systems was completed and is negative except as documented above. Patient denies any other acute medical complaint. The patient's available PMH, PSH, social history, medications, allergies, and triage vital signs were reviewed just prior to their medical evaluation.    The history is provided by the patient.     Review of patient's allergies indicates:   Allergen Reactions    No known drug allergies      Past Medical History:   Diagnosis Date    Cardiomyopathy     CHF (congestive heart failure)     Hypertension      History reviewed. No pertinent surgical history.  History reviewed. No pertinent family history.  Social History     Tobacco Use    Smoking status: Current Some Day Smoker    Smokeless tobacco: Never Used   Substance Use Topics    Alcohol use: Not Currently    Drug use: No     Review of Systems   Constitutional: Negative for fever.   HENT: Positive for sore throat and trouble swallowing. Negative for facial  swelling.    Eyes: Negative for visual disturbance.   Respiratory: Positive for shortness of breath. Negative for cough.    Cardiovascular: Negative for chest pain.   Gastrointestinal: Negative for abdominal pain, diarrhea, nausea and vomiting.   Genitourinary: Negative for dysuria.   Musculoskeletal: Negative for neck pain.   Skin: Negative for rash and wound.   Allergic/Immunologic: Negative for immunocompromised state.   Neurological: Negative for syncope.   Psychiatric/Behavioral: Negative for confusion.   All other systems reviewed and are negative.      Physical Exam     Initial Vitals [06/24/19 1708]   BP Pulse Resp Temp SpO2   109/63 73 18 98.4 °F (36.9 °C) 98 %      MAP       --         Physical Exam    Nursing note and vitals reviewed.  Constitutional: She appears well-developed and well-nourished. She is not diaphoretic. No distress.   HENT:   Head: Normocephalic and atraumatic.   Nose: Nose normal.   Mouth/Throat: No trismus in the jaw. Uvula swelling present. Posterior oropharyngeal edema and posterior oropharyngeal erythema present. No oropharyngeal exudate or tonsillar abscesses.   Eyes: Conjunctivae and EOM are normal. Pupils are equal, round, and reactive to light.   Neck: Trachea normal and full passive range of motion without pain. Neck supple. No thyroid mass present.   Tenderness lymph node 2cm medial to the left mandibular angle   Cardiovascular: Normal rate, regular rhythm, normal heart sounds and intact distal pulses.   Pulmonary/Chest: Breath sounds normal. No respiratory distress. She has no wheezes. She has no rhonchi. She has no rales.   Neurological: She is alert and oriented to person, place, and time. She has normal strength and normal reflexes. No sensory deficit. GCS score is 15. GCS eye subscore is 4. GCS verbal subscore is 5. GCS motor subscore is 6.   Skin: Skin is warm and dry. No erythema.   Psychiatric: She has a normal mood and affect. Thought content normal.         ED Course    Procedures  Labs Reviewed   CBC W/ AUTO DIFFERENTIAL - Abnormal; Notable for the following components:       Result Value    WBC 12.98 (*)     Hematocrit 36.1 (*)     Mean Corpuscular Volume 79 (*)     Mean Corpuscular Hemoglobin 26.6 (*)     Gran # (ANC) 9.5 (*)     Immature Grans (Abs) 0.05 (*)     Lymph% 16.0 (*)     All other components within normal limits   BASIC METABOLIC PANEL - Abnormal; Notable for the following components:    BUN, Bld 23 (*)     Calcium 10.6 (*)     eGFR if  54.5 (*)     eGFR if non  47.3 (*)     All other components within normal limits   PROTIME-INR   POCT URINE PREGNANCY          Imaging Results           CT Soft Tissue Neck With Contrast (Final result)  Result time 06/24/19 21:20:28    Final result by Christiano Harding MD (06/24/19 21:20:28)                 Impression:      1. Diffuse retropharyngeal edema/effusion without evidence of a enhancing walled-off organized collection. Diffuse parapharyngeal edema involving the tonsils, palate and base of the epiglottis without evidence of discrete abscess.  Consider ENT consultation, in particular, if there is concern of airway compromise.  2. Centrilobular and paraseptal emphysematous changes.  3. Bilateral submandibular gland swelling.  No abscess seen..  Additional findings as above.    This report was flagged in Epic as abnormal.    Electronically signed by resident: Arnaud Keane  Date:    06/24/2019  Time:    20:35    Electronically signed by: Christiano Harding MD  Date:    06/24/2019  Time:    21:20             Narrative:    EXAMINATION:  CT SOFT TISSUE NECK WITH CONTRAST    CLINICAL HISTORY:  Neck mass, nonpulsatile, solitary;    TECHNIQUE:  Low dose axial images as well as sagittal and coronal reconstructions were performed from the skull base to the clavicles following the intravenous administration of 75 mL of Omnipaque 350.    COMPARISON:  December 24, 2010.    FINDINGS:  The visualized  structures of the base of the brain demonstrate no gross abnormality.  Paranasal sinuses and mastoid air cells are clear. The osseous structures demonstrate no significant abnormality.    The airways and vascular structures are patent.  There is a small focus of free air within the superior mediastinum along the right posterolateral aspect of the trachea at the T1-2 level which does not appear to communicate with the adjacent trachea or esophagus, stable in appearance compared to 2010 exam.    Diffuse retropharyngeal soft tissue edema/effusion without focal walled-off or enhancing fluid collection.  Diffuse parapharyngeal edema involving the tonsils, palate and base of the epiglottis without evidence of discrete abscess.  Remaining soft tissues of the neck and at the base of the neck are unremarkable.  Bilateral submandibular gland swelling.  The parotid glands are unremarkable.  No findings to correspond to fluctuant mass identified on physical exam. There is no abnormal lymph node enlargement.  There are two hypodensities in the left thyroid lobe measuring up to 0.8 cm, for which no further evaluation is required.    The lung apices are demonstrate paraseptal and centrilobular emphysematous changes, most pronounced at the apices.                              X-Rays:   Independently Interpreted Readings:   Other Readings:  Reviewed CT images, no abscess seen    Medical Decision Making:   History:   Old Medical Records: I decided to obtain old medical records.  Independently Interpreted Test(s):   I have ordered and independently interpreted X-rays - see prior notes.  Clinical Tests:   Lab Tests: Ordered and Reviewed  Radiological Study: Ordered and Reviewed  ED Management:  54-year-old female presents with swelling and pain to the posterior pharynx.  Vitals normal. Physical exam as above.  Labs with elevated white blood cell count.  CT with edema, but no evidence of abscess.  Treated with Decadron in the ED with  vast improvement in symptoms. Patient is maintaining her airway and in no acute distress. Will discharge with Augmentin.  First dose given in the ED.  Ordered expedited ENT follow-up.  She will call tomorrow to arrange.  Her inflammatory symptoms could have been caused by environmental factors, a virus, or bacteria.  I do not believe her symptoms are secondary to her lisinopril use though this cannot be completely excluded.  Patient will return to ED for worsening symptoms, inability to eat/drink, fever greater than 100.4, or any other concerns.  Did bedside teaching with return precautions.  All questions answered.  The patient acknowledges understanding.  Gave verbal discharge instructions.            Scribe Attestation:   Scribe #1: I performed the above scribed service and the documentation accurately describes the services I performed. I attest to the accuracy of the note.               Clinical Impression:       ICD-10-CM ICD-9-CM   1. Pharyngitis, unspecified etiology J02.9 462   2. Pharyngeal swelling J39.2 478.25         Disposition:   Disposition: Discharged  Condition: Stable       Level of Complexity:  High, level 5.                 Espinoza Sparrow MD  06/25/19 [id number]

## 2019-06-24 NOTE — ED TRIAGE NOTES
Patient presents to ED after being referred by urgent care with fluid pocket located under her chin. Patient states it began yesterday and has grown.

## 2019-06-25 NOTE — ED NOTES
Called patient to check on her progress.  She reports improved symptoms. She has not picked up her antibiotic prescription yet.  She says she will do so tomorrow.  She has not made her ENT follow-up appointment yet.  She has her discharge papers and the numbers on the front page.  She will call tomorrow to schedule follow-up.  All questions answered.  She acknowledges understanding.     Espinoza Sparrow MD  06/25/19 0997

## 2019-06-25 NOTE — DISCHARGE INSTRUCTIONS
Take motrin and tylenol over the counter as directed on packaging as needed for pain or fever.     Take antibiotics until gone.    Our goal in the emergency department is to always give you outstanding care and exceptional service. You may receive a survey by mail or e-mail in the next week regarding your experience in our ED. We would greatly appreciate your completing and returning the survey. Your feedback provides us with a way to recognize our staff who give very good care and it helps us learn how to improve when your experience was below our aspiration of excellence.

## 2019-07-29 DIAGNOSIS — I50.42 CHRONIC COMBINED SYSTOLIC AND DIASTOLIC CONGESTIVE HEART FAILURE: ICD-10-CM

## 2019-07-29 DIAGNOSIS — I50.32 CHRONIC DIASTOLIC CONGESTIVE HEART FAILURE: ICD-10-CM

## 2019-07-29 RX ORDER — LISINOPRIL 10 MG/1
TABLET ORAL
Qty: 90 TABLET | Refills: 0 | Status: SHIPPED | OUTPATIENT
Start: 2019-07-29 | End: 2019-11-25 | Stop reason: SDUPTHER

## 2019-07-29 RX ORDER — FUROSEMIDE 40 MG/1
TABLET ORAL
Qty: 30 TABLET | Refills: 0 | Status: SHIPPED | OUTPATIENT
Start: 2019-07-29 | End: 2019-08-28 | Stop reason: SDUPTHER

## 2019-07-29 RX ORDER — CARVEDILOL 12.5 MG/1
TABLET ORAL
Qty: 60 TABLET | Refills: 0 | Status: SHIPPED | OUTPATIENT
Start: 2019-07-29 | End: 2019-08-28 | Stop reason: SDUPTHER

## 2019-08-28 DIAGNOSIS — I50.42 CHRONIC COMBINED SYSTOLIC AND DIASTOLIC CONGESTIVE HEART FAILURE: ICD-10-CM

## 2019-08-28 RX ORDER — FUROSEMIDE 40 MG/1
TABLET ORAL
Qty: 30 TABLET | Refills: 0 | Status: SHIPPED | OUTPATIENT
Start: 2019-08-28 | End: 2019-10-07 | Stop reason: SDUPTHER

## 2019-09-03 RX ORDER — CARVEDILOL 12.5 MG/1
TABLET ORAL
Qty: 60 TABLET | Refills: 0 | Status: SHIPPED | OUTPATIENT
Start: 2019-09-03 | End: 2019-11-07 | Stop reason: SDUPTHER

## 2019-10-07 RX ORDER — FUROSEMIDE 40 MG/1
TABLET ORAL
Qty: 30 TABLET | Refills: 0 | Status: SHIPPED | OUTPATIENT
Start: 2019-10-07 | End: 2019-11-04 | Stop reason: SDUPTHER

## 2019-11-04 RX ORDER — FUROSEMIDE 40 MG/1
TABLET ORAL
Qty: 30 TABLET | Refills: 0 | Status: SHIPPED | OUTPATIENT
Start: 2019-11-04 | End: 2019-12-18 | Stop reason: SDUPTHER

## 2019-11-07 DIAGNOSIS — I50.42 CHRONIC COMBINED SYSTOLIC AND DIASTOLIC CONGESTIVE HEART FAILURE: ICD-10-CM

## 2019-11-07 RX ORDER — CARVEDILOL 12.5 MG/1
TABLET ORAL
Qty: 60 TABLET | Refills: 0 | Status: SHIPPED | OUTPATIENT
Start: 2019-11-07 | End: 2019-12-28

## 2019-11-25 DIAGNOSIS — I50.32 CHRONIC DIASTOLIC CONGESTIVE HEART FAILURE: ICD-10-CM

## 2019-11-26 DIAGNOSIS — I50.32 CHRONIC DIASTOLIC CONGESTIVE HEART FAILURE: ICD-10-CM

## 2019-11-26 RX ORDER — LISINOPRIL 10 MG/1
TABLET ORAL
Qty: 90 TABLET | Refills: 0 | Status: SHIPPED | OUTPATIENT
Start: 2019-11-26 | End: 2019-11-26 | Stop reason: SDUPTHER

## 2019-11-27 RX ORDER — LISINOPRIL 10 MG/1
TABLET ORAL
Qty: 90 TABLET | Refills: 0 | Status: SHIPPED | OUTPATIENT
Start: 2019-11-27 | End: 2020-05-06

## 2019-12-18 RX ORDER — FUROSEMIDE 40 MG/1
TABLET ORAL
Qty: 30 TABLET | Refills: 0 | Status: SHIPPED | OUTPATIENT
Start: 2019-12-18 | End: 2020-01-28

## 2019-12-28 DIAGNOSIS — I50.42 CHRONIC COMBINED SYSTOLIC AND DIASTOLIC CONGESTIVE HEART FAILURE: ICD-10-CM

## 2019-12-28 RX ORDER — CARVEDILOL 12.5 MG/1
TABLET ORAL
Qty: 60 TABLET | Refills: 0 | Status: SHIPPED | OUTPATIENT
Start: 2019-12-28 | End: 2020-02-09

## 2020-01-28 RX ORDER — FUROSEMIDE 40 MG/1
TABLET ORAL
Qty: 30 TABLET | Refills: 0 | Status: SHIPPED | OUTPATIENT
Start: 2020-01-28 | End: 2020-03-11

## 2020-02-07 DIAGNOSIS — I50.42 CHRONIC COMBINED SYSTOLIC AND DIASTOLIC CONGESTIVE HEART FAILURE: ICD-10-CM

## 2020-02-09 RX ORDER — CARVEDILOL 12.5 MG/1
TABLET ORAL
Qty: 60 TABLET | Refills: 0 | Status: SHIPPED | OUTPATIENT
Start: 2020-02-09 | End: 2020-04-06

## 2020-03-11 RX ORDER — FUROSEMIDE 40 MG/1
TABLET ORAL
Qty: 30 TABLET | Refills: 0 | Status: SHIPPED | OUTPATIENT
Start: 2020-03-11 | End: 2020-04-06

## 2020-03-11 RX ORDER — FUROSEMIDE 40 MG/1
TABLET ORAL
Qty: 30 TABLET | Refills: 0 | Status: SHIPPED | OUTPATIENT
Start: 2020-03-11 | End: 2020-03-11

## 2020-04-06 DIAGNOSIS — I50.42 CHRONIC COMBINED SYSTOLIC AND DIASTOLIC CONGESTIVE HEART FAILURE: ICD-10-CM

## 2020-04-06 RX ORDER — CARVEDILOL 12.5 MG/1
TABLET ORAL
Qty: 60 TABLET | Refills: 0 | Status: SHIPPED | OUTPATIENT
Start: 2020-04-06 | End: 2020-05-06

## 2020-04-06 RX ORDER — FUROSEMIDE 40 MG/1
TABLET ORAL
Qty: 30 TABLET | Refills: 0 | Status: SHIPPED | OUTPATIENT
Start: 2020-04-06 | End: 2020-05-14

## 2020-05-05 DIAGNOSIS — I50.32 CHRONIC DIASTOLIC CONGESTIVE HEART FAILURE: ICD-10-CM

## 2020-05-05 DIAGNOSIS — I50.42 CHRONIC COMBINED SYSTOLIC AND DIASTOLIC CONGESTIVE HEART FAILURE: ICD-10-CM

## 2020-05-06 DIAGNOSIS — I50.42 CHRONIC COMBINED SYSTOLIC AND DIASTOLIC CONGESTIVE HEART FAILURE: ICD-10-CM

## 2020-05-06 DIAGNOSIS — I50.32 CHRONIC DIASTOLIC CONGESTIVE HEART FAILURE: ICD-10-CM

## 2020-05-06 RX ORDER — CARVEDILOL 12.5 MG/1
TABLET ORAL
Qty: 60 TABLET | Refills: 0 | Status: SHIPPED | OUTPATIENT
Start: 2020-05-06 | End: 2020-05-06

## 2020-05-06 RX ORDER — LISINOPRIL 10 MG/1
TABLET ORAL
Qty: 90 TABLET | Refills: 0 | Status: SHIPPED | OUTPATIENT
Start: 2020-05-06 | End: 2020-05-06

## 2020-05-06 RX ORDER — LISINOPRIL 10 MG/1
TABLET ORAL
Qty: 90 TABLET | Refills: 0 | Status: SHIPPED | OUTPATIENT
Start: 2020-05-06 | End: 2020-12-17

## 2020-05-06 RX ORDER — CARVEDILOL 12.5 MG/1
TABLET ORAL
Qty: 60 TABLET | Refills: 0 | Status: SHIPPED | OUTPATIENT
Start: 2020-05-06 | End: 2020-07-29 | Stop reason: SDUPTHER

## 2020-05-07 NOTE — TELEPHONE ENCOUNTER
----- Message from Cassidy Leal sent at 5/7/2020  8:28 AM CDT -----  Contact: Arnaud Lares from [facility] just spoke to Patrizia about a refill for this pt and he needs to know if the carvediloL (COREG) 12.5 MG tablet is to be refilled as well or was it discontinued?  He can be reached at [phone].    Thank you

## 2020-05-07 NOTE — TELEPHONE ENCOUNTER
----- Message from Cassidy Leal sent at 5/7/2020  8:28 AM CDT -----  Contact: Arnaud Lares from [facility] just spoke to Patrizia about a refill for this pt and he needs to know if the carvediloL (COREG) 12.5 MG tablet is to be refilled as well or was it discontinued?  He can be reached at [phone].    Thank you

## 2020-05-14 RX ORDER — FUROSEMIDE 40 MG/1
TABLET ORAL
Qty: 30 TABLET | Refills: 0 | Status: SHIPPED | OUTPATIENT
Start: 2020-05-14 | End: 2020-06-23

## 2020-07-29 DIAGNOSIS — I50.42 CHRONIC COMBINED SYSTOLIC AND DIASTOLIC CONGESTIVE HEART FAILURE: ICD-10-CM

## 2020-07-29 RX ORDER — CARVEDILOL 12.5 MG/1
TABLET ORAL
Qty: 60 TABLET | Refills: 0 | Status: SHIPPED | OUTPATIENT
Start: 2020-07-29 | End: 2020-08-17

## 2020-07-29 NOTE — TELEPHONE ENCOUNTER
----- Message from Candi Dunaway sent at 7/29/2020  4:01 PM CDT -----  Regarding: Refill  .Pharmacy Calling    Reason for call: Refill    Pharmacy Name: [facility]    Prescription Name: carvediloL (COREG) 12.5 MG tablet    Phone Number: [phone]    Additional Information: Thanks

## 2021-03-17 RX ORDER — FUROSEMIDE 40 MG/1
TABLET ORAL
Qty: 30 TABLET | Refills: 0 | Status: SHIPPED | OUTPATIENT
Start: 2021-03-17 | End: 2021-05-03

## 2021-12-08 DIAGNOSIS — I50.42 CHRONIC COMBINED SYSTOLIC AND DIASTOLIC CONGESTIVE HEART FAILURE: Primary | ICD-10-CM

## 2022-01-14 ENCOUNTER — HOSPITAL ENCOUNTER (OUTPATIENT)
Dept: CARDIOLOGY | Facility: HOSPITAL | Age: 57
Discharge: HOME | End: 2022-01-14
Attending: INTERNAL MEDICINE
Payer: COMMERCIAL

## 2022-01-14 ENCOUNTER — OFFICE VISIT (OUTPATIENT)
Dept: TRANSPLANT | Facility: CLINIC | Age: 57
End: 2022-01-14
Payer: COMMERCIAL

## 2022-01-14 VITALS
DIASTOLIC BLOOD PRESSURE: 75 MMHG | SYSTOLIC BLOOD PRESSURE: 162 MMHG | HEART RATE: 51 BPM | WEIGHT: 141.56 LBS | HEIGHT: 65 IN | BODY MASS INDEX: 23.58 KG/M2

## 2022-01-14 VITALS
BODY MASS INDEX: 22.2 KG/M2 | WEIGHT: 130 LBS | HEIGHT: 64 IN | SYSTOLIC BLOOD PRESSURE: 130 MMHG | DIASTOLIC BLOOD PRESSURE: 70 MMHG | HEART RATE: 72 BPM

## 2022-01-14 DIAGNOSIS — I50.42 CHRONIC COMBINED SYSTOLIC AND DIASTOLIC CONGESTIVE HEART FAILURE: Primary | ICD-10-CM

## 2022-01-14 DIAGNOSIS — I42.8 CARDIOMYOPATHY, NONISCHEMIC: ICD-10-CM

## 2022-01-14 DIAGNOSIS — I50.42 CHRONIC COMBINED SYSTOLIC AND DIASTOLIC CONGESTIVE HEART FAILURE: ICD-10-CM

## 2022-01-14 DIAGNOSIS — I10 PRIMARY HYPERTENSION: ICD-10-CM

## 2022-01-14 LAB
ASCENDING AORTA: 2.88 CM
AV INDEX (PROSTH): 0.6
AV MEAN GRADIENT: 8 MMHG
AV PEAK GRADIENT: 14 MMHG
AV VALVE AREA: 2.51 CM2
AV VELOCITY RATIO: 0.59
BSA FOR ECHO PROCEDURE: 1.63 M2
CV ECHO LV RWT: 0.28 CM
DOP CALC AO PEAK VEL: 1.87 M/S
DOP CALC AO VTI: 44.45 CM
DOP CALC LVOT AREA: 4.2 CM2
DOP CALC LVOT DIAMETER: 2.3 CM
DOP CALC LVOT PEAK VEL: 1.1 M/S
DOP CALC LVOT STROKE VOLUME: 111.5 CM3
DOP CALCLVOT PEAK VEL VTI: 26.85 CM
E WAVE DECELERATION TIME: 284.82 MSEC
E/A RATIO: 1.11
ECHO LV POSTERIOR WALL: 0.85 CM (ref 0.6–1.1)
EJECTION FRACTION: 55 %
FRACTIONAL SHORTENING: 20 % (ref 28–44)
INTERVENTRICULAR SEPTUM: 0.85 CM (ref 0.6–1.1)
LA MAJOR: 5.1 CM
LA MINOR: 5.1 CM
LA WIDTH: 4.2 CM
LEFT ATRIUM SIZE: 4.3 CM
LEFT ATRIUM VOLUME INDEX MOD: 25.9 ML/M2
LEFT ATRIUM VOLUME INDEX: 48 ML/M2
LEFT ATRIUM VOLUME MOD: 42.18 CM3
LEFT ATRIUM VOLUME: 78.29 CM3
LEFT INTERNAL DIMENSION IN SYSTOLE: 4.91 CM (ref 2.1–4)
LEFT VENTRICLE DIASTOLIC VOLUME INDEX: 114.84 ML/M2
LEFT VENTRICLE DIASTOLIC VOLUME: 187.19 ML
LEFT VENTRICLE MASS INDEX: 127 G/M2
LEFT VENTRICLE SYSTOLIC VOLUME INDEX: 69.4 ML/M2
LEFT VENTRICLE SYSTOLIC VOLUME: 113.13 ML
LEFT VENTRICULAR INTERNAL DIMENSION IN DIASTOLE: 6.1 CM (ref 3.5–6)
LEFT VENTRICULAR MASS: 206.58 G
MV A" WAVE DURATION": 17.89 MSEC
MV PEAK A VEL: 0.8 M/S
MV PEAK E VEL: 0.89 M/S
MV STENOSIS PRESSURE HALF TIME: 82.6 MS
MV VALVE AREA P 1/2 METHOD: 2.66 CM2
PISA TR MAX VEL: 2.04 M/S
PULM VEIN S/D RATIO: 1.3
PV PEAK D VEL: 0.46 M/S
PV PEAK S VEL: 0.6 M/S
RA MAJOR: 4.2 CM
RA PRESSURE: 3 MMHG
RA WIDTH: 2.8 CM
RIGHT VENTRICULAR END-DIASTOLIC DIMENSION: 2.76 CM
SINUS: 2.49 CM
STJ: 2.85 CM
TR MAX PG: 17 MMHG
TRICUSPID ANNULAR PLANE SYSTOLIC EXCURSION: 1.79 CM
TV REST PULMONARY ARTERY PRESSURE: 20 MMHG

## 2022-01-14 PROCEDURE — 3078F DIAST BP <80 MM HG: CPT | Mod: CPTII,S$GLB,, | Performed by: INTERNAL MEDICINE

## 2022-01-14 PROCEDURE — 1159F PR MEDICATION LIST DOCUMENTED IN MEDICAL RECORD: ICD-10-PCS | Mod: CPTII,S$GLB,, | Performed by: INTERNAL MEDICINE

## 2022-01-14 PROCEDURE — 93306 ECHO (CUPID ONLY): ICD-10-PCS | Mod: 26,,, | Performed by: INTERNAL MEDICINE

## 2022-01-14 PROCEDURE — 99215 PR OFFICE/OUTPT VISIT, EST, LEVL V, 40-54 MIN: ICD-10-PCS | Mod: S$GLB,,, | Performed by: INTERNAL MEDICINE

## 2022-01-14 PROCEDURE — 93306 TTE W/DOPPLER COMPLETE: CPT | Mod: 26,,, | Performed by: INTERNAL MEDICINE

## 2022-01-14 PROCEDURE — 3078F PR MOST RECENT DIASTOLIC BLOOD PRESSURE < 80 MM HG: ICD-10-PCS | Mod: CPTII,S$GLB,, | Performed by: INTERNAL MEDICINE

## 2022-01-14 PROCEDURE — 1159F MED LIST DOCD IN RCRD: CPT | Mod: CPTII,S$GLB,, | Performed by: INTERNAL MEDICINE

## 2022-01-14 PROCEDURE — 3008F PR BODY MASS INDEX (BMI) DOCUMENTED: ICD-10-PCS | Mod: CPTII,S$GLB,, | Performed by: INTERNAL MEDICINE

## 2022-01-14 PROCEDURE — 99999 PR PBB SHADOW E&M-EST. PATIENT-LVL III: ICD-10-PCS | Mod: PBBFAC,,, | Performed by: INTERNAL MEDICINE

## 2022-01-14 PROCEDURE — 3077F PR MOST RECENT SYSTOLIC BLOOD PRESSURE >= 140 MM HG: ICD-10-PCS | Mod: CPTII,S$GLB,, | Performed by: INTERNAL MEDICINE

## 2022-01-14 PROCEDURE — 3008F BODY MASS INDEX DOCD: CPT | Mod: CPTII,S$GLB,, | Performed by: INTERNAL MEDICINE

## 2022-01-14 PROCEDURE — 99999 PR PBB SHADOW E&M-EST. PATIENT-LVL III: CPT | Mod: PBBFAC,,, | Performed by: INTERNAL MEDICINE

## 2022-01-14 PROCEDURE — 93306 TTE W/DOPPLER COMPLETE: CPT

## 2022-01-14 PROCEDURE — 99215 OFFICE O/P EST HI 40 MIN: CPT | Mod: S$GLB,,, | Performed by: INTERNAL MEDICINE

## 2022-01-14 PROCEDURE — 3077F SYST BP >= 140 MM HG: CPT | Mod: CPTII,S$GLB,, | Performed by: INTERNAL MEDICINE

## 2022-01-14 RX ORDER — CYCLOBENZAPRINE HCL 10 MG
TABLET ORAL
COMMUNITY
Start: 2022-01-07

## 2022-01-14 RX ORDER — CARVEDILOL 12.5 MG/1
12.5 TABLET ORAL 2 TIMES DAILY
Qty: 180 TABLET | Refills: 3 | Status: SHIPPED | OUTPATIENT
Start: 2022-01-14 | End: 2023-07-24 | Stop reason: SDUPTHER

## 2022-01-14 RX ORDER — FUROSEMIDE 40 MG/1
40 TABLET ORAL DAILY
Qty: 90 TABLET | Refills: 3 | Status: SHIPPED | OUTPATIENT
Start: 2022-01-14 | End: 2023-03-28

## 2022-01-14 NOTE — PROGRESS NOTES
Subjective:     HPI:  Ms. Diaz is very pleasant 56y/o female with stage C HFrEF, NICMP LVEF 25% with reverse remodeling to 55-60% on Echo done today. She is doing well and continues to report NYHA class II symptoms.  She denies SOB at rest, CP, lightheadedness, or other c/o's. No admissions for ADHF in the last year. Her HF regimen incudes Coreg 12.5 mg BID, Lisinopril 10 mg daily and  Lasix 40 mg daily. Labs today were reviewed and are all WNL.   .  2D Echo with CFD done today  · The left ventricle is moderately enlarged with eccentric hypertrophy and normal systolic function. The estimated ejection fraction is 55%.  · Normal right ventricular size with normal right ventricular systolic function.  · Normal left ventricular diastolic function.  · Severe left atrial enlargement.  · Mild mitral regurgitation.  · Moderate aortic regurgitation.  · The estimated PA systolic pressure is 20 mmHg.  · Normal central venous pressure (3 mmHg).      Past Medical History:   Diagnosis Date    Cardiomyopathy     CHF (congestive heart failure)     Hypertension      No past surgical history on file.  OB History    No obstetric history on file.       Review of Systems   Constitutional: Negative. Negative for chills, decreased appetite, diaphoresis, fever, malaise/fatigue, night sweats, weight gain and weight loss.   Eyes: Negative.    Cardiovascular: Positive for dyspnea on exertion. Negative for chest pain, claudication, cyanosis, irregular heartbeat, leg swelling, near-syncope, orthopnea, palpitations, paroxysmal nocturnal dyspnea and syncope.   Respiratory: Negative for cough, hemoptysis and shortness of breath.    Endocrine: Negative.    Hematologic/Lymphatic: Negative.    Skin: Negative for color change, dry skin and nail changes.   Musculoskeletal: Negative.    Gastrointestinal: Negative.    Genitourinary: Negative.    Neurological: Negative for weakness.       Objective:   Blood pressure (!) 162/75, pulse (!) 51, height 5'  "5" (1.651 m), weight 64.2 kg (141 lb 8.6 oz).body mass index is 23.55 kg/m².  Physical Exam  Vitals and nursing note reviewed.   Constitutional:       General: Vital signs are normal.      Appearance: She is well-developed and well-nourished.   HENT:      Head: Normocephalic.   Eyes:      Pupils: Pupils are equal, round, and reactive to light.   Neck:      Vascular: No JVD.   Cardiovascular:      Rate and Rhythm: Normal rate and regular rhythm.      Chest Wall: PMI is not displaced.      Pulses: Intact distal pulses.      Heart sounds: Normal heart sounds. No murmur heard.  No friction rub. No gallop.    Pulmonary:      Effort: Pulmonary effort is normal.      Breath sounds: Normal breath sounds. No wheezing or rales.   Abdominal:      General: Bowel sounds are normal.      Palpations: Abdomen is soft.   Musculoskeletal:         General: No edema.      Cervical back: Neck supple.   Neurological:      Mental Status: She is alert and oriented to person, place, and time.         Labs:    Chemistry        Component Value Date/Time     01/14/2022 1238    K 4.8 01/14/2022 1238     01/14/2022 1238    CO2 24 01/14/2022 1238    BUN 11 01/14/2022 1238    CREATININE 0.8 01/14/2022 1238    GLU 90 01/14/2022 1238        Component Value Date/Time    CALCIUM 10.1 01/14/2022 1238    ALKPHOS 73 01/03/2018 1222    AST 16 01/03/2018 1222    ALT 14 01/03/2018 1222    BILITOT 0.3 01/03/2018 1222    ESTGFRAFRICA >60.0 01/14/2022 1238    EGFRNONAA >60.0 01/14/2022 1238          Magnesium   Date Value Ref Range Status   05/08/2017 2.1 1.6 - 2.6 mg/dL Final     Lab Results   Component Value Date    WBC 12.98 (H) 06/24/2019    HGB 12.1 06/24/2019    HCT 36.1 (L) 06/24/2019     06/24/2019     Lab Results   Component Value Date    INR 0.9 06/24/2019    INR 1.1 12/25/2010    INR 1.1 12/24/2010     BNP   Date Value Ref Range Status   01/14/2022 65 0 - 99 pg/mL Final     Comment:     Values of less than 100 pg/ml are consistent " "with non-CHF populations.   05/08/2017 224 (H) 0 - 99 pg/mL Final     Comment:     Values of less than 100 pg/ml are consistent with non-CHF populations.   09/10/2015 143 (H) 0 - 99 pg/mL Final     Comment:     Values of less than 100 pg/ml are consistent with non-CHF populations.       Assessment:      1. Chronic combined systolic and diastolic congestive heart failure    2. Cardiomyopathy, nonischemic    3. Primary hypertension        Plan:   Doing very well. Euvolemic on exam. NYHA class II symptoms. EF=55-60% by echo today  Continue GDMT.   In view of her recovered LV function and clinical stability she does not need to follow-up with us. She does need to establish care with General Cardiology. I will refer her to my Colleague Dr. Arnaud Minor (Randy)     Recommend 2 gram sodium restriction and 1500cc fluid restriction.  Encourage physical activity with graded exercise program.  Requested patient to weigh themselves daily, and to notify us if their weight increases by more than 3 lbs in 1 day or 5 lbs in 1 week.      Aldo jordan MD        "

## 2023-07-21 DIAGNOSIS — I50.42 CHRONIC COMBINED SYSTOLIC AND DIASTOLIC CONGESTIVE HEART FAILURE: ICD-10-CM

## 2023-07-24 RX ORDER — CARVEDILOL 12.5 MG/1
TABLET ORAL
Qty: 180 TABLET | Refills: 3 | Status: SHIPPED | OUTPATIENT
Start: 2023-07-24 | End: 2023-07-31 | Stop reason: SDUPTHER

## 2023-07-28 NOTE — PROGRESS NOTES
"       Cardiology Clinic Note  Reason for Visit: HFpEF    HPI:     Nafisa Diaz is a 58 y.o. F, who presents for HFpEF.    She has been doing well.  She usually runs on track 8-10a daily, not as much with the hot weather.  She also swims/water aerobics.    She denies orthostasis.  She is trying to stop smoking. Smoking 1 PPD.  She eats low salt.    She was off lisinopril for some time.  She called and received a few days worth.    No symptoms of angina, HF, arrhythmia, claudication, or syncope.    Medical: NICM/HFpEF (LVEF 25% with recovery to 55%), HTN  Surgical: Reviewed, as below.  Family: Reviewed, as below. No premature CAD, HF, SCD.  Social: Reviewed, as below. Smoking.    ROS:    Pertinent ROS included in HPI and below.  PMH:     Past Medical History:   Diagnosis Date    Hypertension      No past surgical history on file.  Allergies:     Review of patient's allergies indicates:   Allergen Reactions    No known drug allergies      Medications:     Current Outpatient Medications:     aspirin (ECOTRIN) 81 MG EC tablet, Take 1 tablet by mouth., Disp: , Rfl:     carvediloL (COREG) 12.5 MG tablet, TAKE ONE TABLET BY MOUTH TWO (2) TIMES A DAY WITH MEALS, Disp: 180 tablet, Rfl: 3    cyclobenzaprine (FLEXERIL) 10 MG tablet, , Disp: , Rfl:     ferrous sulfate 325 (65 FE) MG EC tablet, Take 1 tablet by mouth., Disp: , Rfl:     furosemide (LASIX) 40 MG tablet, TAKE ONE TABLET BY MOUTH DAILY, Disp: 90 tablet, Rfl: 3    lisinopriL 10 MG tablet, TAKE ONE TABLET BY MOUTH EVERY DAY, Disp: 30 tablet, Rfl: 0   Social History:     Social History     Tobacco Use    Smoking status: Some Days    Smokeless tobacco: Never   Substance Use Topics    Alcohol use: Not Currently     Family History:   No family history on file.  Physical Exam:   BP (!) 146/93   Pulse 97   Ht 5' 5" (1.651 m)   Wt 63.6 kg (140 lb 3.4 oz)   SpO2 97%   BMI 23.33 kg/m²      Constitutional: No apparent distress, conversant  Neck: No jugular venous " distension, no carotid bruits  CV: Regular rate and rhythm, no murmurs, normal S1/S2  Pulm: Clear to auscultation bilaterally  Extremities: No lower extremity edema, warm with palpable pulses    Labs:     Blood Tests:  Lab Results   Component Value Date    BNP 65 01/14/2022     01/14/2022    K 4.8 01/14/2022     01/14/2022    CO2 24 01/14/2022    BUN 11 01/14/2022    CREATININE 0.8 01/14/2022    GLU 90 01/14/2022    MG 2.1 05/08/2017    AST 16 01/03/2018    ALT 14 01/03/2018    ALBUMIN 3.7 01/03/2018    PROT 7.2 01/03/2018    BILITOT 0.3 01/03/2018    WBC 12.98 (H) 06/24/2019    HGB 12.1 06/24/2019    HCT 36.1 (L) 06/24/2019    MCV 79 (L) 06/24/2019     06/24/2019    INR 0.9 06/24/2019       No results found for: CHOL, HDL, LDL, TRIG    No results found for: LDLCALC    Urine Tests:  Lab Results   Component Value Date    COLORU STRAW 12/24/2010    APPEARANCEUA CLEAR 12/24/2010    PHUR 5.0 12/24/2010    SPECGRAV 1.005 12/24/2010    PROTEINUA Negative 12/24/2010    GLUCUA Negative 12/24/2010    KETONESU Negative 12/24/2010    BILIRUBINUA Negative 12/24/2010    OCCULTUA Negative 12/24/2010    NITRITE Negative 12/24/2010    UROBILINOGEN 0.2 12/24/2010    LEUKOCYTESUR Negative 12/24/2010       Imaging:     Echocardiogram  TTE 1/14/22  The left ventricle is moderately enlarged with eccentric hypertrophy and normal systolic function. The estimated ejection fraction is 55%.  Normal right ventricular size with normal right ventricular systolic function.  Normal left ventricular diastolic function.  Severe left atrial enlargement.  Mild mitral regurgitation.  Moderate aortic regurgitation.  The estimated PA systolic pressure is 20 mmHg.  Normal central venous pressure (3 mmHg).    Stress testing  SPECT 12/29/10  IMPRESSION: NO EVIDENCE OF MYOCARDIAL ISCHEMIA.      Cath Lab  None    Other  CTA chest 12/24/10  THERE IS A LEFT-SIDED AORTIC ARCH WITH THREE BRANCH VESSELS.  THE AORTA MAINTAINS NORMAL CALIBER,  CONTOUR AND COURSE WITHOUT EVIDENCE OF ANEURYSM OR DISSECTION.  NO SIGNIFICANT ATHEROSCLEROSIS IS NOTED.  PULMONARY ARTERIES DISTRIBUTE NORMALLY WITHOUT FILLING DEFECTS TO SUGGEST PULMONARY THROMBOEMBOLISM.    EK/29/10 - NSR, LVH with repol (personally reviewed)    Assessment:     1. Chronic diastolic congestive heart failure    2. Primary hypertension      Plan:     Chronic diastolic congestive heart failure  Euvolemic, stable  Continue lisinopril, coreg    Primary hypertension  BP above goal today, but she reports it is usually controlled  Keep home BP log   Low salt diet  Regular exercise    Signed:  Naldo Minor MD  Cardiology     Follow-up:     Future Appointments   Date Time Provider Department Center   2023  9:00 AM Arnaud Minor III, MD [facility] CARDIO [facility]   2023  8:00 AM Aldo Marcus MD [facility] HEARTTX [facility]

## 2023-07-31 ENCOUNTER — OFFICE VISIT (OUTPATIENT)
Dept: CARDIOLOGY | Facility: CLINIC | Age: 58
End: 2023-07-31
Payer: COMMERCIAL

## 2023-07-31 ENCOUNTER — PATIENT MESSAGE (OUTPATIENT)
Dept: CARDIOLOGY | Facility: CLINIC | Age: 58
End: 2023-07-31

## 2023-07-31 ENCOUNTER — LAB VISIT (OUTPATIENT)
Dept: LAB | Facility: HOSPITAL | Age: 58
End: 2023-07-31
Attending: INTERNAL MEDICINE
Payer: COMMERCIAL

## 2023-07-31 VITALS
BODY MASS INDEX: 23.36 KG/M2 | WEIGHT: 140.19 LBS | SYSTOLIC BLOOD PRESSURE: 146 MMHG | HEIGHT: 65 IN | OXYGEN SATURATION: 97 % | DIASTOLIC BLOOD PRESSURE: 93 MMHG | HEART RATE: 97 BPM

## 2023-07-31 DIAGNOSIS — I50.32 CHRONIC DIASTOLIC CONGESTIVE HEART FAILURE: Primary | ICD-10-CM

## 2023-07-31 DIAGNOSIS — E78.00 PURE HYPERCHOLESTEROLEMIA: Primary | ICD-10-CM

## 2023-07-31 DIAGNOSIS — I50.32 CHRONIC DIASTOLIC CONGESTIVE HEART FAILURE: ICD-10-CM

## 2023-07-31 DIAGNOSIS — I10 PRIMARY HYPERTENSION: ICD-10-CM

## 2023-07-31 DIAGNOSIS — I50.42 CHRONIC COMBINED SYSTOLIC AND DIASTOLIC CONGESTIVE HEART FAILURE: ICD-10-CM

## 2023-07-31 LAB
ALBUMIN SERPL BCP-MCNC: 3.9 G/DL (ref 3.5–5.2)
ALP SERPL-CCNC: 81 U/L (ref 55–135)
ALT SERPL W/O P-5'-P-CCNC: 10 U/L (ref 10–44)
ANION GAP SERPL CALC-SCNC: 9 MMOL/L (ref 8–16)
AST SERPL-CCNC: 21 U/L (ref 10–40)
BILIRUB SERPL-MCNC: 0.2 MG/DL (ref 0.1–1)
BUN SERPL-MCNC: 15 MG/DL (ref 6–20)
CALCIUM SERPL-MCNC: 9.9 MG/DL (ref 8.7–10.5)
CHLORIDE SERPL-SCNC: 111 MMOL/L (ref 95–110)
CHOLEST SERPL-MCNC: 319 MG/DL (ref 120–199)
CHOLEST/HDLC SERPL: 9.7 {RATIO} (ref 2–5)
CO2 SERPL-SCNC: 23 MMOL/L (ref 23–29)
CREAT SERPL-MCNC: 1 MG/DL (ref 0.5–1.4)
ERYTHROCYTE [DISTWIDTH] IN BLOOD BY AUTOMATED COUNT: 14.6 % (ref 11.5–14.5)
EST. GFR  (NO RACE VARIABLE): >60 ML/MIN/1.73 M^2
GLUCOSE SERPL-MCNC: 103 MG/DL (ref 70–110)
HCT VFR BLD AUTO: 36 % (ref 37–48.5)
HDLC SERPL-MCNC: 33 MG/DL (ref 40–75)
HDLC SERPL: 10.3 % (ref 20–50)
HGB BLD-MCNC: 12 G/DL (ref 12–16)
LDLC SERPL CALC-MCNC: 236.6 MG/DL (ref 63–159)
MCH RBC QN AUTO: 25.9 PG (ref 27–31)
MCHC RBC AUTO-ENTMCNC: 33.3 G/DL (ref 32–36)
MCV RBC AUTO: 78 FL (ref 82–98)
NONHDLC SERPL-MCNC: 286 MG/DL
PLATELET # BLD AUTO: 351 K/UL (ref 150–450)
PMV BLD AUTO: 10.3 FL (ref 9.2–12.9)
POTASSIUM SERPL-SCNC: 4 MMOL/L (ref 3.5–5.1)
PROT SERPL-MCNC: 7.9 G/DL (ref 6–8.4)
RBC # BLD AUTO: 4.63 M/UL (ref 4–5.4)
SODIUM SERPL-SCNC: 143 MMOL/L (ref 136–145)
TRIGL SERPL-MCNC: 247 MG/DL (ref 30–150)
WBC # BLD AUTO: 9.59 K/UL (ref 3.9–12.7)

## 2023-07-31 PROCEDURE — 85027 COMPLETE CBC AUTOMATED: CPT | Performed by: INTERNAL MEDICINE

## 2023-07-31 PROCEDURE — 3008F PR BODY MASS INDEX (BMI) DOCUMENTED: ICD-10-PCS | Mod: CPTII,S$GLB,, | Performed by: INTERNAL MEDICINE

## 2023-07-31 PROCEDURE — 99999 PR PBB SHADOW E&M-EST. PATIENT-LVL III: ICD-10-PCS | Mod: PBBFAC,,, | Performed by: INTERNAL MEDICINE

## 2023-07-31 PROCEDURE — 4010F PR ACE/ARB THEARPY RXD/TAKEN: ICD-10-PCS | Mod: CPTII,S$GLB,, | Performed by: INTERNAL MEDICINE

## 2023-07-31 PROCEDURE — 4010F ACE/ARB THERAPY RXD/TAKEN: CPT | Mod: CPTII,S$GLB,, | Performed by: INTERNAL MEDICINE

## 2023-07-31 PROCEDURE — 1159F MED LIST DOCD IN RCRD: CPT | Mod: CPTII,S$GLB,, | Performed by: INTERNAL MEDICINE

## 2023-07-31 PROCEDURE — 3077F SYST BP >= 140 MM HG: CPT | Mod: CPTII,S$GLB,, | Performed by: INTERNAL MEDICINE

## 2023-07-31 PROCEDURE — 1159F PR MEDICATION LIST DOCUMENTED IN MEDICAL RECORD: ICD-10-PCS | Mod: CPTII,S$GLB,, | Performed by: INTERNAL MEDICINE

## 2023-07-31 PROCEDURE — 99204 PR OFFICE/OUTPT VISIT, NEW, LEVL IV, 45-59 MIN: ICD-10-PCS | Mod: S$GLB,,, | Performed by: INTERNAL MEDICINE

## 2023-07-31 PROCEDURE — 99999 PR PBB SHADOW E&M-EST. PATIENT-LVL III: CPT | Mod: PBBFAC,,, | Performed by: INTERNAL MEDICINE

## 2023-07-31 PROCEDURE — 3077F PR MOST RECENT SYSTOLIC BLOOD PRESSURE >= 140 MM HG: ICD-10-PCS | Mod: CPTII,S$GLB,, | Performed by: INTERNAL MEDICINE

## 2023-07-31 PROCEDURE — 80053 COMPREHEN METABOLIC PANEL: CPT | Performed by: INTERNAL MEDICINE

## 2023-07-31 PROCEDURE — 3008F BODY MASS INDEX DOCD: CPT | Mod: CPTII,S$GLB,, | Performed by: INTERNAL MEDICINE

## 2023-07-31 PROCEDURE — 3080F PR MOST RECENT DIASTOLIC BLOOD PRESSURE >= 90 MM HG: ICD-10-PCS | Mod: CPTII,S$GLB,, | Performed by: INTERNAL MEDICINE

## 2023-07-31 PROCEDURE — 80061 LIPID PANEL: CPT | Performed by: INTERNAL MEDICINE

## 2023-07-31 PROCEDURE — 99204 OFFICE O/P NEW MOD 45 MIN: CPT | Mod: S$GLB,,, | Performed by: INTERNAL MEDICINE

## 2023-07-31 PROCEDURE — 36415 COLL VENOUS BLD VENIPUNCTURE: CPT | Performed by: INTERNAL MEDICINE

## 2023-07-31 PROCEDURE — 3080F DIAST BP >= 90 MM HG: CPT | Mod: CPTII,S$GLB,, | Performed by: INTERNAL MEDICINE

## 2023-07-31 RX ORDER — ROSUVASTATIN CALCIUM 40 MG/1
40 TABLET, COATED ORAL NIGHTLY
Qty: 90 TABLET | Refills: 3 | Status: SHIPPED | OUTPATIENT
Start: 2023-07-31 | End: 2024-07-30

## 2023-07-31 RX ORDER — FUROSEMIDE 40 MG/1
40 TABLET ORAL DAILY
Qty: 90 TABLET | Refills: 3 | Status: SHIPPED | OUTPATIENT
Start: 2023-07-31

## 2023-07-31 RX ORDER — LISINOPRIL 10 MG/1
10 TABLET ORAL DAILY
Qty: 90 TABLET | Refills: 3 | Status: SHIPPED | OUTPATIENT
Start: 2023-07-31

## 2023-07-31 RX ORDER — CARVEDILOL 12.5 MG/1
12.5 TABLET ORAL 2 TIMES DAILY WITH MEALS
Qty: 180 TABLET | Refills: 3 | Status: SHIPPED | OUTPATIENT
Start: 2023-07-31

## 2023-08-29 ENCOUNTER — TELEPHONE (OUTPATIENT)
Dept: SMOKING CESSATION | Facility: CLINIC | Age: 58
End: 2023-08-29
Payer: COMMERCIAL

## 2023-08-29 NOTE — TELEPHONE ENCOUNTER
Called patient regarding [employer] benefits. Patient states that she had received counseling but never got the medication. Patient states that she would like to set up an appointment at a clinic in [facility]. Patient was transferred to  to set appointment.

## 2023-09-06 ENCOUNTER — CLINICAL SUPPORT (OUTPATIENT)
Dept: SMOKING CESSATION | Facility: CLINIC | Age: 58
End: 2023-09-06
Payer: COMMERCIAL

## 2023-09-06 DIAGNOSIS — F17.200 NICOTINE DEPENDENCE: Primary | ICD-10-CM

## 2023-09-06 PROCEDURE — 99404 PREV MED CNSL INDIV APPRX 60: CPT | Mod: S$GLB,,,

## 2023-09-06 PROCEDURE — 99404 PR PREVENT COUNSEL,INDIV,60 MIN: ICD-10-PCS | Mod: S$GLB,,,

## 2023-09-06 RX ORDER — DM/P-EPHED/ACETAMINOPH/DOXYLAM 30-7.5/3
2 LIQUID (ML) ORAL
Qty: 168 LOZENGE | Refills: 0 | Status: SHIPPED | OUTPATIENT
Start: 2023-09-06 | End: 2023-09-21

## 2023-09-06 RX ORDER — IBUPROFEN 200 MG
1 TABLET ORAL DAILY
Qty: 14 PATCH | Refills: 0 | Status: SHIPPED | OUTPATIENT
Start: 2023-09-06 | End: 2023-09-21

## 2023-09-06 RX ORDER — ALPRAZOLAM 2 MG/1
TABLET, EXTENDED RELEASE ORAL DAILY
COMMUNITY

## 2023-09-06 NOTE — Clinical Note
Patient was seen in clinic today for intake. Patient states smoking 1 ppd.  Patient's CO monitor was  20 ppm.  Patient will begin the prescribed tobacco cessation medication regimen of  21 mg nicotine QD and 2 mg nicotine lozenges.  Completion of TCRS (Tobacco Cessation Rating Scale) reviewed learned addiction, model, personal reasons for quitting, medications and goals.  Prescribed medication management will be by physician.  Patient will continue with sessions and medication monitoring by CTTS.

## 2023-09-15 ENCOUNTER — PATIENT MESSAGE (OUTPATIENT)
Dept: TRANSPLANT | Facility: CLINIC | Age: 58
End: 2023-09-15
Payer: COMMERCIAL

## 2023-09-15 ENCOUNTER — TELEPHONE (OUTPATIENT)
Dept: TRANSPLANT | Facility: CLINIC | Age: 58
End: 2023-09-15
Payer: COMMERCIAL

## 2023-09-15 NOTE — TELEPHONE ENCOUNTER
Missed appt on 9/12/2023 with Dr. Marcus.  Contacted to reschedule, no answer. Message with contact left.  Message through patient portal sent as well.

## 2023-09-15 NOTE — TELEPHONE ENCOUNTER
----- Message from Katia Chiang RN sent at 9/15/2023  4:21 PM CDT -----  Regarding: FW: CHF f/u  Pt missed appt.  Was contacted by phone to reschedule.  No answer, VM left.  Message via portal sent to reschedule as well. 9/15/2023 [id number] Katia Chiang RN  ----- Message -----  From: Chapis Armstrong RN  Sent: 9/12/2023   8:24 AM CDT  To: [facility] Heart Failure Clinical  Subject: CHF f/u

## 2023-09-20 ENCOUNTER — TELEPHONE (OUTPATIENT)
Dept: SMOKING CESSATION | Facility: CLINIC | Age: 58
End: 2023-09-20

## 2023-09-20 NOTE — TELEPHONE ENCOUNTER
[facility]-called patient for missed follow up appointment, no answer, no voicemail.

## 2023-09-21 DIAGNOSIS — F17.200 NICOTINE DEPENDENCE: ICD-10-CM

## 2023-09-21 RX ORDER — DM/P-EPHED/ACETAMINOPH/DOXYLAM 30-7.5/3
LIQUID (ML) ORAL
Qty: 162 LOZENGE | Refills: 0 | Status: SHIPPED | OUTPATIENT
Start: 2023-09-21

## 2023-09-21 RX ORDER — IBUPROFEN 200 MG
TABLET ORAL
Qty: 28 PATCH | Refills: 2 | Status: SHIPPED | OUTPATIENT
Start: 2023-09-21

## 2023-09-21 NOTE — TELEPHONE ENCOUNTER
Refill Routing Note   Medication(s) are not appropriate for processing by [facility] for the following reason(s):      Medication outside of protocol  Responsible provider unclear    [facility] action(s):  Route Care Due:  None identified              Appointments  past 12m or future 3m with PCP    Date Provider   Last Visit   Visit date not found Ángel Baltazar MD   Next Visit   Visit date not found Ángel Baltazar MD   ED visits in past 90 days: 0        Note composed:2:38 PM 09/21/2023

## 2023-09-26 ENCOUNTER — TELEPHONE (OUTPATIENT)
Dept: SMOKING CESSATION | Facility: CLINIC | Age: 58
End: 2023-09-26
Payer: COMMERCIAL

## 2023-09-26 NOTE — TELEPHONE ENCOUNTER
[facility]-called patient for missed follow up appointment, no answer.  Left message and contact number was given.

## 2023-10-05 ENCOUNTER — TELEPHONE (OUTPATIENT)
Dept: SMOKING CESSATION | Facility: CLINIC | Age: 58
End: 2023-10-05
Payer: COMMERCIAL

## 2023-10-12 ENCOUNTER — TELEPHONE (OUTPATIENT)
Dept: SMOKING CESSATION | Facility: CLINIC | Age: 58
End: 2023-10-12
Payer: COMMERCIAL

## 2023-12-06 ENCOUNTER — TELEPHONE (OUTPATIENT)
Dept: SMOKING CESSATION | Facility: CLINIC | Age: 58
End: 2023-12-06
Payer: COMMERCIAL

## 2024-03-04 ENCOUNTER — CLINICAL SUPPORT (OUTPATIENT)
Dept: SMOKING CESSATION | Facility: CLINIC | Age: 59
End: 2024-03-04
Payer: COMMERCIAL

## 2024-03-04 DIAGNOSIS — F17.200 NICOTINE DEPENDENCE: Primary | ICD-10-CM

## 2024-03-04 PROCEDURE — 99407 BEHAV CHNG SMOKING > 10 MIN: CPT | Mod: S$GLB,,,

## 2024-03-04 PROCEDURE — 99999 PR PBB SHADOW E&M-EST. PATIENT-LVL I: CPT | Mod: PBBFAC,,,

## 2024-03-06 ENCOUNTER — TELEPHONE (OUTPATIENT)
Dept: SMOKING CESSATION | Facility: CLINIC | Age: 59
End: 2024-03-06
Payer: COMMERCIAL

## 2024-03-06 NOTE — TELEPHONE ENCOUNTER
[facility]-called patient regarding missed intake appointment, no answer. Unable to leave a voice message.

## 2024-03-11 ENCOUNTER — TELEPHONE (OUTPATIENT)
Dept: SMOKING CESSATION | Facility: CLINIC | Age: 59
End: 2024-03-11
Payer: COMMERCIAL

## 2024-03-11 NOTE — TELEPHONE ENCOUNTER
[facility]-called patient regarding missed intake appointment.  Left message with patient's sister and contact number was given.

## 2024-03-20 ENCOUNTER — TELEPHONE (OUTPATIENT)
Dept: SMOKING CESSATION | Facility: CLINIC | Age: 59
End: 2024-03-20
Payer: COMMERCIAL

## 2024-03-20 NOTE — TELEPHONE ENCOUNTER
[facility]-called patient regarding missed intake appointment.  Got disconnected, tried again and no answer.

## 2024-10-07 ENCOUNTER — TELEPHONE (OUTPATIENT)
Dept: SMOKING CESSATION | Facility: CLINIC | Age: 59
End: 2024-10-07
Payer: COMMERCIAL

## 2024-10-07 NOTE — TELEPHONE ENCOUNTER
Called patient about 12 month follow up. Left message for patient to call [phone]. Resolved quit episode.